# Patient Record
Sex: FEMALE | Race: BLACK OR AFRICAN AMERICAN | NOT HISPANIC OR LATINO | URBAN - METROPOLITAN AREA
[De-identification: names, ages, dates, MRNs, and addresses within clinical notes are randomized per-mention and may not be internally consistent; named-entity substitution may affect disease eponyms.]

---

## 2017-06-08 ENCOUNTER — INPATIENT (INPATIENT)
Facility: HOSPITAL | Age: 42
LOS: 0 days | Discharge: HOME | DRG: 812 | End: 2017-06-09
Attending: HOSPITALIST | Admitting: HOSPITALIST
Payer: COMMERCIAL

## 2017-06-08 VITALS
OXYGEN SATURATION: 98 % | RESPIRATION RATE: 18 BRPM | HEART RATE: 110 BPM | DIASTOLIC BLOOD PRESSURE: 65 MMHG | SYSTOLIC BLOOD PRESSURE: 145 MMHG | WEIGHT: 199.96 LBS | TEMPERATURE: 99 F

## 2017-06-08 DIAGNOSIS — Z29.9 ENCOUNTER FOR PROPHYLACTIC MEASURES, UNSPECIFIED: ICD-10-CM

## 2017-06-08 DIAGNOSIS — R63.8 OTHER SYMPTOMS AND SIGNS CONCERNING FOOD AND FLUID INTAKE: ICD-10-CM

## 2017-06-08 DIAGNOSIS — D50.0 IRON DEFICIENCY ANEMIA SECONDARY TO BLOOD LOSS (CHRONIC): ICD-10-CM

## 2017-06-08 DIAGNOSIS — D25.9 LEIOMYOMA OF UTERUS, UNSPECIFIED: ICD-10-CM

## 2017-06-08 LAB
ALBUMIN SERPL ELPH-MCNC: 3.9 G/DL — SIGNIFICANT CHANGE UP (ref 3.3–5)
ALP SERPL-CCNC: 60 U/L — SIGNIFICANT CHANGE UP (ref 40–120)
ALT FLD-CCNC: 9 U/L — LOW (ref 10–45)
ANION GAP SERPL CALC-SCNC: 15 MMOL/L — SIGNIFICANT CHANGE UP (ref 5–17)
ANISOCYTOSIS BLD QL: SIGNIFICANT CHANGE UP
APPEARANCE UR: CLEAR — SIGNIFICANT CHANGE UP
APTT BLD: 27.6 SEC — SIGNIFICANT CHANGE UP (ref 27.5–37.4)
AST SERPL-CCNC: 14 U/L — SIGNIFICANT CHANGE UP (ref 10–40)
BASOPHILS NFR BLD AUTO: 0.9 % — SIGNIFICANT CHANGE UP (ref 0–2)
BILIRUB SERPL-MCNC: 0.3 MG/DL — SIGNIFICANT CHANGE UP (ref 0.2–1.2)
BILIRUB UR-MCNC: NEGATIVE — SIGNIFICANT CHANGE UP
BUN SERPL-MCNC: 6 MG/DL — LOW (ref 7–23)
CALCIUM SERPL-MCNC: 8.7 MG/DL — SIGNIFICANT CHANGE UP (ref 8.4–10.5)
CHLORIDE SERPL-SCNC: 102 MMOL/L — SIGNIFICANT CHANGE UP (ref 96–108)
CO2 SERPL-SCNC: 21 MMOL/L — LOW (ref 22–31)
COLOR SPEC: YELLOW — SIGNIFICANT CHANGE UP
CREAT SERPL-MCNC: 0.6 MG/DL — SIGNIFICANT CHANGE UP (ref 0.5–1.3)
DACRYOCYTES BLD QL SMEAR: SIGNIFICANT CHANGE UP
DIFF PNL FLD: NEGATIVE — SIGNIFICANT CHANGE UP
EOSINOPHIL NFR BLD AUTO: 0.4 % — SIGNIFICANT CHANGE UP (ref 0–6)
GLUCOSE SERPL-MCNC: 119 MG/DL — HIGH (ref 70–99)
GLUCOSE UR QL: NEGATIVE — SIGNIFICANT CHANGE UP
HCG SERPL-ACNC: <.1 MIU/ML — SIGNIFICANT CHANGE UP
HCT VFR BLD CALC: 15.2 % — CRITICAL LOW (ref 34.5–45)
HCT VFR BLD CALC: 18.1 % — CRITICAL LOW (ref 34.5–45)
HGB BLD-MCNC: 3.7 G/DL — CRITICAL LOW (ref 11.5–15.5)
HGB BLD-MCNC: 5 G/DL — CRITICAL LOW (ref 11.5–15.5)
HYPOCHROMIA BLD QL: SIGNIFICANT CHANGE UP
INR BLD: 1.25 — HIGH (ref 0.88–1.16)
IRON SATN MFR SERPL: 16 UG/DL — LOW (ref 30–160)
IRON SATN MFR SERPL: 4 % — LOW (ref 14–50)
KETONES UR-MCNC: NEGATIVE — SIGNIFICANT CHANGE UP
LEUKOCYTE ESTERASE UR-ACNC: NEGATIVE — SIGNIFICANT CHANGE UP
LG PLATELETS BLD QL AUTO: PRESENT — SIGNIFICANT CHANGE UP
LYMPHOCYTES # BLD AUTO: 27.5 % — SIGNIFICANT CHANGE UP (ref 13–44)
MANUAL DIF COMMENT BLD-IMP: SIGNIFICANT CHANGE UP
MCHC RBC-ENTMCNC: 16.6 PG — LOW (ref 27–34)
MCHC RBC-ENTMCNC: 19.8 PG — LOW (ref 27–34)
MCHC RBC-ENTMCNC: 24.3 G/DL — LOW (ref 32–36)
MCHC RBC-ENTMCNC: 27.6 G/DL — LOW (ref 32–36)
MCV RBC AUTO: 68.2 FL — LOW (ref 80–100)
MCV RBC AUTO: 71.8 FL — LOW (ref 80–100)
MICROCYTES BLD QL: SIGNIFICANT CHANGE UP
MONOCYTES NFR BLD AUTO: 7 % — SIGNIFICANT CHANGE UP (ref 2–14)
NEUTROPHILS NFR BLD AUTO: 64.2 % — SIGNIFICANT CHANGE UP (ref 43–77)
NITRITE UR-MCNC: NEGATIVE — SIGNIFICANT CHANGE UP
PH UR: 7 — SIGNIFICANT CHANGE UP (ref 5–8)
PLAT MORPH BLD: (no result)
PLATELET # BLD AUTO: 318 K/UL — SIGNIFICANT CHANGE UP (ref 150–400)
PLATELET # BLD AUTO: 433 K/UL — HIGH (ref 150–400)
POTASSIUM SERPL-MCNC: 3.7 MMOL/L — SIGNIFICANT CHANGE UP (ref 3.5–5.3)
POTASSIUM SERPL-SCNC: 3.7 MMOL/L — SIGNIFICANT CHANGE UP (ref 3.5–5.3)
PROT SERPL-MCNC: 7.7 G/DL — SIGNIFICANT CHANGE UP (ref 6–8.3)
PROT UR-MCNC: NEGATIVE MG/DL — SIGNIFICANT CHANGE UP
PROTHROM AB SERPL-ACNC: 13.9 SEC — HIGH (ref 9.8–12.7)
RBC # BLD: 2.23 M/UL — LOW (ref 3.8–5.2)
RBC # BLD: 2.23 M/UL — LOW (ref 3.8–5.2)
RBC # BLD: 2.52 M/UL — LOW (ref 3.8–5.2)
RBC # FLD: 29.9 % — HIGH (ref 10.3–16.9)
RBC # FLD: 32.6 % — HIGH (ref 10.3–16.9)
RBC BLD AUTO: (no result)
RETICS/RBC NFR: 1.1 % — SIGNIFICANT CHANGE UP (ref 0.5–2.5)
SCHISTOCYTES BLD QL AUTO: SIGNIFICANT CHANGE UP
SODIUM SERPL-SCNC: 138 MMOL/L — SIGNIFICANT CHANGE UP (ref 135–145)
SP GR SPEC: 1.01 — SIGNIFICANT CHANGE UP (ref 1–1.03)
TIBC SERPL-MCNC: 434 UG/DL — HIGH (ref 220–430)
UIBC SERPL-MCNC: 418 UG/DL — HIGH (ref 110–370)
UROBILINOGEN FLD QL: 1 E.U./DL — SIGNIFICANT CHANGE UP
WBC # BLD: 5 K/UL — SIGNIFICANT CHANGE UP (ref 3.8–10.5)
WBC # BLD: 5.4 K/UL — SIGNIFICANT CHANGE UP (ref 3.8–10.5)
WBC # FLD AUTO: 5 K/UL — SIGNIFICANT CHANGE UP (ref 3.8–10.5)
WBC # FLD AUTO: 5.4 K/UL — SIGNIFICANT CHANGE UP (ref 3.8–10.5)

## 2017-06-08 PROCEDURE — 99223 1ST HOSP IP/OBS HIGH 75: CPT | Mod: GC

## 2017-06-08 PROCEDURE — 93010 ELECTROCARDIOGRAM REPORT: CPT

## 2017-06-08 PROCEDURE — 99285 EMERGENCY DEPT VISIT HI MDM: CPT | Mod: 25

## 2017-06-08 RX ORDER — POTASSIUM CHLORIDE 20 MEQ
40 PACKET (EA) ORAL ONCE
Qty: 0 | Refills: 0 | Status: COMPLETED | OUTPATIENT
Start: 2017-06-08 | End: 2017-06-08

## 2017-06-08 RX ORDER — ACETAMINOPHEN 500 MG
650 TABLET ORAL EVERY 6 HOURS
Qty: 0 | Refills: 0 | Status: DISCONTINUED | OUTPATIENT
Start: 2017-06-08 | End: 2017-06-09

## 2017-06-08 RX ORDER — SODIUM CHLORIDE 9 MG/ML
1000 INJECTION INTRAMUSCULAR; INTRAVENOUS; SUBCUTANEOUS ONCE
Qty: 0 | Refills: 0 | Status: COMPLETED | OUTPATIENT
Start: 2017-06-08 | End: 2017-06-08

## 2017-06-08 RX ORDER — DIPHENHYDRAMINE HCL 50 MG
25 CAPSULE ORAL ONCE
Qty: 0 | Refills: 0 | Status: COMPLETED | OUTPATIENT
Start: 2017-06-08 | End: 2017-06-08

## 2017-06-08 RX ORDER — ACETAMINOPHEN 500 MG
650 TABLET ORAL ONCE
Qty: 0 | Refills: 0 | Status: COMPLETED | OUTPATIENT
Start: 2017-06-08 | End: 2017-06-08

## 2017-06-08 RX ADMIN — SODIUM CHLORIDE 1000 MILLILITER(S): 9 INJECTION INTRAMUSCULAR; INTRAVENOUS; SUBCUTANEOUS at 12:57

## 2017-06-08 RX ADMIN — Medication 650 MILLIGRAM(S): at 16:00

## 2017-06-08 RX ADMIN — Medication 25 MILLIGRAM(S): at 15:09

## 2017-06-08 RX ADMIN — Medication 40 MILLIEQUIVALENT(S): at 15:01

## 2017-06-08 RX ADMIN — Medication 650 MILLIGRAM(S): at 15:09

## 2017-06-08 NOTE — ED PROVIDER NOTE - MEDICAL DECISION MAKING DETAILS
41yoF here with symptomatic anemia, hgb 3.7, hemodynamically stable, no active bleeding, admit for PRBC transfusion.

## 2017-06-08 NOTE — ED ADULT NURSE REASSESSMENT NOTE - NS ED NURSE REASSESS COMMENT FT1
MD Yoder request pt to be moved to OB/GYN Room for pelvic exam. Pt educated on importance. Pt refuses. MD Eaton made aware. Will continue to monitor.

## 2017-06-08 NOTE — H&P ADULT - NSHPPHYSICALEXAM_GEN_ALL_CORE
OVERNIGHT EVENTS:    VITAL SIGNS: Vital Signs Last 24 Hrs  T(F): 99, Max: 99.2 (06-08 @ 12:42)  HR: 97 (97 - 110)  BP: 127/70 (127/70 - 145/65)  RR: 18 (16 - 18)  SpO2: 100% (98% - 100%)    Constitutional: well appearing, WDWN, NAD  HEENT: NCAT, PERRL, sclera non-icteric, conjunctival pallor, MMM, neck supple, no JVD  Respiratory: CTA b/l, no wheezes, no rales, no rhonchi  Cardiovascular: RRR, normal s1/s2, no MRG  Gastrointestinal: soft, NTND, +BS, no guarding, no organomegaly  Extremities: WWP, DP/radial pulses 2+ b/l, no edema  Neurological: AAOx 3, responds to commands, moves all extremities, CN 2-12 intact  Musculoskeletal: 5/5 strength throughout  Gyn: Pt refused  Rectal: Pt refused

## 2017-06-08 NOTE — H&P ADULT - NSHPLABSRESULTS_GEN_ALL_CORE
3.7    5.4   )-----------( 433      ( 08 Jun 2017 12:48 )             15.2     138  |  102  |  6<L>  ----------------------------<  119<H>  3.7   |  21<L>  |  0.60    Ca    8.7      08 Jun 2017 12:48    TPro  7.7  /  Alb  3.9  /  TBili  0.3  /  DBili  x   /  AST  14  /  ALT  9<L>  /  AlkPhos  60  06-08 -                         3.7    5.4   )-----------( 433      ( 08 Jun 2017 12:48 )             15.2     138  |  102  |  6<L>  ----------------------------<  119<H>  3.7   |  21<L>  |  0.60    Ca    8.7      08 Jun 2017 12:48    TPro  7.7  /  Alb  3.9  /  TBili  0.3  /  DBili  x   /  AST  14  /  ALT  9<L>  /  AlkPhos  60  06-08

## 2017-06-08 NOTE — ED PROVIDER NOTE - OBJECTIVE STATEMENT
41yoF here with low hgb found on outpt labs. Pt with hx of severe anemia d/t fibroids and heavy menses. About 5 days ago, pt felt lightheaded and had near-syncopal episode, went to her PMD, who checked labs and called her today with the result of a hgb of 3. Pt c/o general fatigue with SOB on exertion x one month, LMP was 5/20, lasted 8 days, days #2 & 3 were heavy but the rest of the days were her normal amount of bleeding. No chest pain, no f/c, no cough/uri sx, no abd pain, no urinary sx, not currently bleeding.

## 2017-06-08 NOTE — H&P ADULT - ASSESSMENT
42 y/o F w/ a PMHx of fibroids and menorrhagia presenting w/ symptomatic anemia, found w/ Hgb of 3.7, being admitted for transfusion.

## 2017-06-08 NOTE — ED ADULT NURSE NOTE - OBJECTIVE STATEMENT
41y F, Hx of anemia, A&ox3, came into Er for low hemoglobin. States had blood done Saturday and "my doctors said it was 3" History of multiple blood transfusions. States had heavy menses in may 20 to may 28. Pt Denies sob, cp, n/v, no abdominal pain. Denies active bleeding. Lungs clear bilateral. States "when I was in the shower I got dizzy." C/o intermittent dyspnea on exertion x1 mo. PT appears mildly pale. On arrival pt standing and chatting on phone appears in no distress. Labs drawn. Will continue to monitor. 41y F, Hx of anemia, A&ox3, came into Er for low hemoglobin. States had blood done Saturday and "my doctors said it was 3" History of multiple blood transfusions. States had heavy menses in may 20 to may 28. Pt Denies sob, cp, n/v, no abdominal pain. Denies active bleeding. Lungs clear bilateral. States "when I was in the shower I got dizzy." C/o intermittent dyspnea on exertion x1 mo. PT appears pale. On arrival pt standing and chatting on phone appears in no distress. Labs drawn. Will continue to monitor.

## 2017-06-08 NOTE — H&P ADULT - HISTORY OF PRESENT ILLNESS
Pt is a 42 y/o F w/ a PMHx of fibroids and menorrhagia, presenting after informed by pt's PCP of  low hgb outpt labs. Pt reported starting to having SUERO for the last 2 weeks, and had an episode of lightheadedness on Saturday, after which pt went to see her PCP, who michael labs on her and pt was found w/ a hgb of 3. Pt's LMP was ion 5/20, lasting 8 days w/ heavy bleeding on days 2 & 3. Pt had a similar episode 2 years ago and had to be transfused at the time. Pt denied any bloody bowel movement, no blood in urine. Denied any chest pain, or SOB at rest. Denied any fevers/chills, cough, sore throat.     In the ED, pt's VSS and pt was found w/ a hgb of 3.7, upon which pt was ordered 2U of PRBC transfusion.

## 2017-06-08 NOTE — H&P ADULT - ATTENDING COMMENTS
pt seen and examined by me. case d/w housestaff. agree with VS, PE, assessment and plan as outlined above.

## 2017-06-08 NOTE — H&P ADULT - PROBLEM SELECTOR PLAN 2
-  Pt w/ hx of fibroids associated w/ menorrhagia.  - Outpt f/u w/ Dr. Ramey (Gyn) -  Pt w/ hx of fibroids associated w/ menorrhagia.  - Outpt f/u w/ Dr. Ramey (Gyn)  - f/u Pelvic U/S

## 2017-06-08 NOTE — ED ADULT TRIAGE NOTE - CHIEF COMPLAINT QUOTE
patient BIBA for low Hgb. hx of chronic anemia. had blood drawn on sunday. patient comlpains of mild exertional SOB, no other complaints

## 2017-06-08 NOTE — ED ADULT NURSE NOTE - CHPI ED SYMPTOMS NEG
no chills/no nausea/no decreased eating/drinking/no tingling/no pain/no vomiting/no numbness/no weakness/no fever

## 2017-06-08 NOTE — H&P ADULT - PROBLEM SELECTOR PLAN 1
-  Pt found w/ a hgb of 3.7, presenting w/ symptomatic anemia, associated w/ lightheadedeness and SUERO. Microcytic  - Start 2U of PRBC transfusion - f/u post-transfusion CBC  - f/u Iron panel -  Pt found w/ a hgb of 3.7, presenting w/ symptomatic anemia, associated w/ lightheadedeness and SUERO. Microcytic  - Start 2U of PRBC transfusion - f/u post-transfusion CBC  - f/u Iron panel  - f/u Pelvic U/S, fecal occult

## 2017-06-09 VITALS
SYSTOLIC BLOOD PRESSURE: 113 MMHG | DIASTOLIC BLOOD PRESSURE: 77 MMHG | HEART RATE: 74 BPM | RESPIRATION RATE: 19 BRPM | TEMPERATURE: 97 F | OXYGEN SATURATION: 98 %

## 2017-06-09 LAB
ANION GAP SERPL CALC-SCNC: 15 MMOL/L — SIGNIFICANT CHANGE UP (ref 5–17)
BUN SERPL-MCNC: 6 MG/DL — LOW (ref 7–23)
CALCIUM SERPL-MCNC: 9.3 MG/DL — SIGNIFICANT CHANGE UP (ref 8.4–10.5)
CHLORIDE SERPL-SCNC: 105 MMOL/L — SIGNIFICANT CHANGE UP (ref 96–108)
CO2 SERPL-SCNC: 20 MMOL/L — LOW (ref 22–31)
CREAT SERPL-MCNC: 0.6 MG/DL — SIGNIFICANT CHANGE UP (ref 0.5–1.3)
GLUCOSE SERPL-MCNC: 79 MG/DL — SIGNIFICANT CHANGE UP (ref 70–99)
HCT VFR BLD CALC: 22.3 % — LOW (ref 34.5–45)
HCT VFR BLD CALC: 22.7 % — LOW (ref 34.5–45)
HGB BLD-MCNC: 6.7 G/DL — CRITICAL LOW (ref 11.5–15.5)
HGB BLD-MCNC: 6.8 G/DL — CRITICAL LOW (ref 11.5–15.5)
MAGNESIUM SERPL-MCNC: 2.1 MG/DL — SIGNIFICANT CHANGE UP (ref 1.6–2.6)
MCHC RBC-ENTMCNC: 21.4 PG — LOW (ref 27–34)
MCHC RBC-ENTMCNC: 21.5 PG — LOW (ref 27–34)
MCHC RBC-ENTMCNC: 30 G/DL — LOW (ref 32–36)
MCHC RBC-ENTMCNC: 30 G/DL — LOW (ref 32–36)
MCV RBC AUTO: 71.4 FL — LOW (ref 80–100)
MCV RBC AUTO: 71.5 FL — LOW (ref 80–100)
PLATELET # BLD AUTO: 265 K/UL — SIGNIFICANT CHANGE UP (ref 150–400)
PLATELET # BLD AUTO: 289 K/UL — SIGNIFICANT CHANGE UP (ref 150–400)
POTASSIUM SERPL-MCNC: 4 MMOL/L — SIGNIFICANT CHANGE UP (ref 3.5–5.3)
POTASSIUM SERPL-SCNC: 4 MMOL/L — SIGNIFICANT CHANGE UP (ref 3.5–5.3)
RBC # BLD: 3.12 M/UL — LOW (ref 3.8–5.2)
RBC # BLD: 3.18 M/UL — LOW (ref 3.8–5.2)
RBC # FLD: 27.2 % — HIGH (ref 10.3–16.9)
RBC # FLD: 27.4 % — HIGH (ref 10.3–16.9)
SODIUM SERPL-SCNC: 140 MMOL/L — SIGNIFICANT CHANGE UP (ref 135–145)
WBC # BLD: 4.2 K/UL — SIGNIFICANT CHANGE UP (ref 3.8–10.5)
WBC # BLD: 4.6 K/UL — SIGNIFICANT CHANGE UP (ref 3.8–10.5)
WBC # FLD AUTO: 4.2 K/UL — SIGNIFICANT CHANGE UP (ref 3.8–10.5)
WBC # FLD AUTO: 4.6 K/UL — SIGNIFICANT CHANGE UP (ref 3.8–10.5)

## 2017-06-09 PROCEDURE — 36430 TRANSFUSION BLD/BLD COMPNT: CPT

## 2017-06-09 PROCEDURE — 93005 ELECTROCARDIOGRAM TRACING: CPT

## 2017-06-09 PROCEDURE — 86850 RBC ANTIBODY SCREEN: CPT

## 2017-06-09 PROCEDURE — 85027 COMPLETE CBC AUTOMATED: CPT

## 2017-06-09 PROCEDURE — 84702 CHORIONIC GONADOTROPIN TEST: CPT

## 2017-06-09 PROCEDURE — 85025 COMPLETE CBC W/AUTO DIFF WBC: CPT

## 2017-06-09 PROCEDURE — 85045 AUTOMATED RETICULOCYTE COUNT: CPT

## 2017-06-09 PROCEDURE — 86901 BLOOD TYPING SEROLOGIC RH(D): CPT

## 2017-06-09 PROCEDURE — 81003 URINALYSIS AUTO W/O SCOPE: CPT

## 2017-06-09 PROCEDURE — 85610 PROTHROMBIN TIME: CPT

## 2017-06-09 PROCEDURE — 80048 BASIC METABOLIC PNL TOTAL CA: CPT

## 2017-06-09 PROCEDURE — 83735 ASSAY OF MAGNESIUM: CPT

## 2017-06-09 PROCEDURE — 36415 COLL VENOUS BLD VENIPUNCTURE: CPT

## 2017-06-09 PROCEDURE — 80053 COMPREHEN METABOLIC PANEL: CPT

## 2017-06-09 PROCEDURE — 82728 ASSAY OF FERRITIN: CPT

## 2017-06-09 PROCEDURE — P9016: CPT

## 2017-06-09 PROCEDURE — 99239 HOSP IP/OBS DSCHRG MGMT >30: CPT

## 2017-06-09 PROCEDURE — 85730 THROMBOPLASTIN TIME PARTIAL: CPT

## 2017-06-09 PROCEDURE — 86900 BLOOD TYPING SEROLOGIC ABO: CPT

## 2017-06-09 PROCEDURE — 86923 COMPATIBILITY TEST ELECTRIC: CPT

## 2017-06-09 PROCEDURE — 83550 IRON BINDING TEST: CPT

## 2017-06-09 PROCEDURE — 99285 EMERGENCY DEPT VISIT HI MDM: CPT | Mod: 25

## 2017-06-09 RX ORDER — SODIUM FERRIC GLUCONAT/SUCROSE 62.5MG/5ML
25 AMPUL (ML) INTRAVENOUS ONCE
Qty: 0 | Refills: 0 | Status: COMPLETED | OUTPATIENT
Start: 2017-06-09 | End: 2017-06-09

## 2017-06-09 RX ORDER — SODIUM FERRIC GLUCONAT/SUCROSE 62.5MG/5ML
125 AMPUL (ML) INTRAVENOUS DAILY
Qty: 0 | Refills: 0 | Status: DISCONTINUED | OUTPATIENT
Start: 2017-06-10 | End: 2017-06-09

## 2017-06-09 RX ORDER — DOCUSATE SODIUM 100 MG
1 CAPSULE ORAL
Qty: 30 | Refills: 0 | OUTPATIENT
Start: 2017-06-09 | End: 2017-07-09

## 2017-06-09 RX ORDER — ASCORBIC ACID 60 MG
1 TABLET,CHEWABLE ORAL
Qty: 30 | Refills: 0 | OUTPATIENT
Start: 2017-06-09 | End: 2017-07-09

## 2017-06-09 RX ORDER — POLYETHYLENE GLYCOL 3350 17 G/17G
17 POWDER, FOR SOLUTION ORAL
Qty: 1020 | Refills: 0 | OUTPATIENT
Start: 2017-06-09 | End: 2017-07-09

## 2017-06-09 RX ORDER — FERROUS SULFATE 325(65) MG
1 TABLET ORAL
Qty: 30 | Refills: 0 | OUTPATIENT
Start: 2017-06-09 | End: 2017-07-09

## 2017-06-09 RX ORDER — SODIUM FERRIC GLUCONAT/SUCROSE 62.5MG/5ML
100 AMPUL (ML) INTRAVENOUS ONCE
Qty: 0 | Refills: 0 | Status: COMPLETED | OUTPATIENT
Start: 2017-06-09 | End: 2017-06-09

## 2017-06-09 RX ADMIN — Medication 116 MILLIGRAM(S): at 10:51

## 2017-06-09 RX ADMIN — Medication 156 MILLIGRAM(S): at 09:28

## 2017-06-09 NOTE — DISCHARGE NOTE ADULT - PROVIDER TOKENS
FREE:[LAST:[Ana],FIRST:[[facility]],PHONE:[(   )    -],FAX:[(   )    -]],FREE:[LAST:[Medicine],FIRST:[Ana [facility]],PHONE:[phone]],FAX:[(   )    -],ADDRESS:[[address] [place] [address]]]

## 2017-06-09 NOTE — DISCHARGE NOTE ADULT - PLAN OF CARE
Follow up with a primary care physician for IV Iron infusions and oral iron You have anemia due to chronic blood loss from your uterine fibroids. You improved with 3 units of blood and iron infusion. Please see a primary care doctor at [facility] and obtain a referral to a hematologist. Please start taking oral iron pills, 325 mg ferrous sulfate daily. It can make you constipated so please take colace, a stool softener (over the counter also) with it daily. You can take it with vitamin c supplementaion or orange juice as it can help with absorption. Follow up with your OB/GYN please follow up with your ob/gyn for your fibroids as it is the cause of your anemia.

## 2017-06-09 NOTE — DISCHARGE NOTE ADULT - PATIENT PORTAL LINK FT
“You can access the [facility] Patient Portal, offered by [facility], by registering with the following website: [email or web address]”

## 2017-06-09 NOTE — DISCHARGE NOTE ADULT - CARE PROVIDER_API CALL
Ana, [facility]  Phone: (   )    -  Fax: (   )    -    Medicine, [place]  [address] [place], [address]  Phone: [phone]  Fax: (   )    -

## 2017-06-09 NOTE — DISCHARGE NOTE ADULT - CARE PLAN
Principal Discharge DX:	Anemia  Goal:	Follow up with a primary care physician for IV Iron infusions and oral iron  Instructions for follow-up, activity and diet:	You have anemia due to chronic blood loss from your uterine fibroids. You improved with 3 units of blood and iron infusion. Please see a primary care doctor at [facility] and obtain a referral to a hematologist. Please start taking oral iron pills, 325 mg ferrous sulfate daily. It can make you constipated so please take colace, a stool softener (over the counter also) with it daily. You can take it with vitamin c supplementaion or orange juice as it can help with absorption.  Secondary Diagnosis:	Fibroids  Goal:	Follow up with your OB/GYN  Instructions for follow-up, activity and diet:	please follow up with your ob/gyn for your fibroids as it is the cause of your anemia. Principal Discharge DX:	Anemia  Goal:	Follow up with a primary care physician for IV Iron infusions and oral iron  Instructions for follow-up, activity and diet:	You have anemia due to chronic blood loss from your uterine fibroids. You improved with 3 units of blood and iron infusion. Please see a primary care doctor at [facility] and obtain a referral to a hematologist. Please start taking oral iron pills, 325 mg ferrous sulfate daily. It can make you constipated so please take colace, a stool softener (over the counter also) with it daily. You can take it with vitamin c supplementaion or orange juice as it can help with absorption.  Secondary Diagnosis:	Fibroids  Goal:	Follow up with your OB/GYN  Instructions for follow-up, activity and diet:	please follow up with your ob/gyn for your fibroids as it is the cause of your anemia. Principal Discharge DX:	Anemia  Goal:	Follow up with a primary care physician for IV Iron infusions and oral iron  Instructions for follow-up, activity and diet:	You have anemia due to chronic blood loss from your uterine fibroids. You improved with 3 units of blood and iron infusion. Please see a primary care doctor at [facility] and obtain a referral to a hematologist. Please start taking oral iron pills, 325 mg ferrous sulfate daily. It can make you constipated so please take colace, a stool softener (over the counter also) with it daily. You can take it with vitamin c supplementaion or orange juice as it can help with absorption.  Secondary Diagnosis:	Fibroids  Goal:	Follow up with your OB/GYN  Instructions for follow-up, activity and diet:	please follow up with your ob/gyn for your fibroids as it is the cause of your anemia. Principal Discharge DX:	Anemia  Goal:	Follow up with a primary care physician for IV Iron infusions and oral iron  Instructions for follow-up, activity and diet:	You have anemia due to chronic blood loss from your uterine fibroids. You improved with 3 units of blood and iron infusion. Please see a primary care doctor at [facility] and obtain a referral to a hematologist. Please start taking oral iron pills, 325 mg ferrous sulfate daily. It can make you constipated so please take colace, a stool softener (over the counter also) with it daily. You can take it with vitamin c supplementaion or orange juice as it can help with absorption.  Secondary Diagnosis:	Fibroids  Goal:	Follow up with your OB/GYN  Instructions for follow-up, activity and diet:	please follow up with your ob/gyn for your fibroids as it is the cause of your anemia.

## 2017-06-09 NOTE — DISCHARGE NOTE ADULT - MEDICATION SUMMARY - MEDICATIONS TO TAKE
I will START or STAY ON the medications listed below when I get home from the hospital:    Antivert 25 mg oral tablet  -- 1 tab(s) by mouth 2 times a day  -- May cause drowsiness.  Alcohol may intensify this effect.  Use care when operating dangerous machinery.    -- Indication: For Vertigo I will START or STAY ON the medications listed below when I get home from the hospital:    Antivert 25 mg oral tablet  -- 1 tab(s) by mouth 2 times a day  -- May cause drowsiness.  Alcohol may intensify this effect.  Use care when operating dangerous machinery.    -- Indication: For Vertigo    ferrous sulfate 325 mg (65 mg elemental iron) oral tablet  -- 1 tab(s) by mouth once a day  -- Check with your doctor before becoming pregnant.  Do not chew, break, or crush.  May discolor urine or feces.    -- Indication: For Iron Deficiency Anemia    MiraLax oral powder for reconstitution  -- 17 gram(s) by mouth 2 times a day, As Needed -for constipation  -- Dilute this medication with liquid before administration.  It is very important that you take or use this exactly as directed.  Do not skip doses or discontinue unless directed by your doctor.    -- Indication: For Prophylactic measure    docusate calcium 240 mg oral capsule  -- 1 cap(s) by mouth once a day  -- Medication should be taken with plenty of water.    -- Indication: For Prophylactic measure    ascorbic acid 100 mg oral tablet, chewable  -- 1 tab(s) by mouth once a day  -- Chew tablets before swallowing    -- Indication: For Prophylactic measure

## 2017-06-09 NOTE — DISCHARGE NOTE ADULT - HOSPITAL COURSE
42 y/o F w/ a PMHx of fibroids and menorrhagia, presenting after informed by pt's PCP of  low hgb outpt labs. No active bleeding. Upon arrival pt was tachycardic to 114 with  systolic. Hgb 3.7 upon admission with iron panel showing microcytic anemia consistent with iron deficiency anemia. Patient received 3 units of pRBC total with repeat hgb of 6.8. Given IV iron infusion. Patient's symptoms improved, ready for dc with close follow up with primary care at [facility]. 40 y/o F w/ a PMHx of fibroids and menorrhagia, presenting after informed by pt's PCP of  low hgb outpt labs. No active bleeding. Upon arrival pt was tachycardic to 114 with  systolic. Hgb 3.7 upon admission with iron panel showing microcytic anemia consistent with iron deficiency anemia. Patient received 3 units of pRBC total with repeat hgb of 6.8. Given IV iron infusion. Pelvic US obtained, showed ____________. Patient's symptoms improved, ready for dc with close follow up with primary care at [facility].

## 2017-06-13 DIAGNOSIS — D50.0 IRON DEFICIENCY ANEMIA SECONDARY TO BLOOD LOSS (CHRONIC): ICD-10-CM

## 2017-06-13 DIAGNOSIS — N92.0 EXCESSIVE AND FREQUENT MENSTRUATION WITH REGULAR CYCLE: ICD-10-CM

## 2017-06-13 DIAGNOSIS — Z88.6 ALLERGY STATUS TO ANALGESIC AGENT: ICD-10-CM

## 2017-06-13 DIAGNOSIS — D25.9 LEIOMYOMA OF UTERUS, UNSPECIFIED: ICD-10-CM

## 2017-06-13 DIAGNOSIS — R00.0 TACHYCARDIA, UNSPECIFIED: ICD-10-CM

## 2017-06-13 DIAGNOSIS — D64.9 ANEMIA, UNSPECIFIED: ICD-10-CM

## 2017-07-17 PROBLEM — Z00.00 ENCOUNTER FOR PREVENTIVE HEALTH EXAMINATION: Status: ACTIVE | Noted: 2017-07-17

## 2017-08-15 ENCOUNTER — APPOINTMENT (OUTPATIENT)
Dept: INTERNAL MEDICINE | Facility: CLINIC | Age: 42
End: 2017-08-15

## 2019-04-17 ENCOUNTER — TRANSCRIPTION ENCOUNTER (OUTPATIENT)
Age: 44
End: 2019-04-17

## 2019-04-17 ENCOUNTER — INPATIENT (INPATIENT)
Facility: HOSPITAL | Age: 44
LOS: 0 days | Discharge: HOME | DRG: 812 | End: 2019-04-18
Attending: STUDENT IN AN ORGANIZED HEALTH CARE EDUCATION/TRAINING PROGRAM | Admitting: STUDENT IN AN ORGANIZED HEALTH CARE EDUCATION/TRAINING PROGRAM
Payer: COMMERCIAL

## 2019-04-17 VITALS
OXYGEN SATURATION: 96 % | RESPIRATION RATE: 20 BRPM | SYSTOLIC BLOOD PRESSURE: 163 MMHG | HEART RATE: 102 BPM | WEIGHT: 210.1 LBS | DIASTOLIC BLOOD PRESSURE: 68 MMHG | TEMPERATURE: 98 F

## 2019-04-17 DIAGNOSIS — Z91.89 OTHER SPECIFIED PERSONAL RISK FACTORS, NOT ELSEWHERE CLASSIFIED: ICD-10-CM

## 2019-04-17 DIAGNOSIS — Z29.9 ENCOUNTER FOR PROPHYLACTIC MEASURES, UNSPECIFIED: ICD-10-CM

## 2019-04-17 DIAGNOSIS — D50.0 IRON DEFICIENCY ANEMIA SECONDARY TO BLOOD LOSS (CHRONIC): ICD-10-CM

## 2019-04-17 DIAGNOSIS — D21.9 BENIGN NEOPLASM OF CONNECTIVE AND OTHER SOFT TISSUE, UNSPECIFIED: ICD-10-CM

## 2019-04-17 LAB
ALBUMIN SERPL ELPH-MCNC: 4.2 G/DL — SIGNIFICANT CHANGE UP (ref 3.3–5)
ALP SERPL-CCNC: 61 U/L — SIGNIFICANT CHANGE UP (ref 40–120)
ALT FLD-CCNC: 16 U/L — SIGNIFICANT CHANGE UP (ref 10–45)
ANION GAP SERPL CALC-SCNC: 13 MMOL/L — SIGNIFICANT CHANGE UP (ref 5–17)
AST SERPL-CCNC: 28 U/L — SIGNIFICANT CHANGE UP (ref 10–40)
BASOPHILS # BLD AUTO: 0.07 K/UL — SIGNIFICANT CHANGE UP (ref 0–0.2)
BASOPHILS NFR BLD AUTO: 0.8 % — SIGNIFICANT CHANGE UP (ref 0–2)
BILIRUB SERPL-MCNC: 0.3 MG/DL — SIGNIFICANT CHANGE UP (ref 0.2–1.2)
BLD GP AB SCN SERPL QL: NEGATIVE — SIGNIFICANT CHANGE UP
BUN SERPL-MCNC: 6 MG/DL — LOW (ref 7–23)
CALCIUM SERPL-MCNC: 9.6 MG/DL — SIGNIFICANT CHANGE UP (ref 8.4–10.5)
CHLORIDE SERPL-SCNC: 105 MMOL/L — SIGNIFICANT CHANGE UP (ref 96–108)
CO2 SERPL-SCNC: 23 MMOL/L — SIGNIFICANT CHANGE UP (ref 22–31)
CREAT SERPL-MCNC: 0.66 MG/DL — SIGNIFICANT CHANGE UP (ref 0.5–1.3)
EOSINOPHIL # BLD AUTO: 0.08 K/UL — SIGNIFICANT CHANGE UP (ref 0–0.5)
EOSINOPHIL NFR BLD AUTO: 0.9 % — SIGNIFICANT CHANGE UP (ref 0–6)
GLUCOSE SERPL-MCNC: 111 MG/DL — HIGH (ref 70–99)
HCT VFR BLD CALC: 25.9 % — LOW (ref 34.5–45)
HGB BLD-MCNC: 6.4 G/DL — CRITICAL LOW (ref 11.5–15.5)
IMM GRANULOCYTES NFR BLD AUTO: 0.2 % — SIGNIFICANT CHANGE UP (ref 0–1.5)
LYMPHOCYTES # BLD AUTO: 2.11 K/UL — SIGNIFICANT CHANGE UP (ref 1–3.3)
LYMPHOCYTES # BLD AUTO: 24.1 % — SIGNIFICANT CHANGE UP (ref 13–44)
MCHC RBC-ENTMCNC: 17.7 PG — LOW (ref 27–34)
MCHC RBC-ENTMCNC: 24.7 GM/DL — LOW (ref 32–36)
MCV RBC AUTO: 71.7 FL — LOW (ref 80–100)
MONOCYTES # BLD AUTO: 0.48 K/UL — SIGNIFICANT CHANGE UP (ref 0–0.9)
MONOCYTES NFR BLD AUTO: 5.5 % — SIGNIFICANT CHANGE UP (ref 2–14)
NEUTROPHILS # BLD AUTO: 5.99 K/UL — SIGNIFICANT CHANGE UP (ref 1.8–7.4)
NEUTROPHILS NFR BLD AUTO: 68.5 % — SIGNIFICANT CHANGE UP (ref 43–77)
NRBC # BLD: 0 /100 WBCS — SIGNIFICANT CHANGE UP (ref 0–0)
PLATELET # BLD AUTO: 440 K/UL — HIGH (ref 150–400)
POTASSIUM SERPL-MCNC: 3.8 MMOL/L — SIGNIFICANT CHANGE UP (ref 3.5–5.3)
POTASSIUM SERPL-SCNC: 3.8 MMOL/L — SIGNIFICANT CHANGE UP (ref 3.5–5.3)
PROT SERPL-MCNC: 8.2 G/DL — SIGNIFICANT CHANGE UP (ref 6–8.3)
RBC # BLD: 3.61 M/UL — LOW (ref 3.8–5.2)
RBC # FLD: 26.5 % — HIGH (ref 10.3–14.5)
RH IG SCN BLD-IMP: POSITIVE — SIGNIFICANT CHANGE UP
SODIUM SERPL-SCNC: 141 MMOL/L — SIGNIFICANT CHANGE UP (ref 135–145)
WBC # BLD: 8.75 K/UL — SIGNIFICANT CHANGE UP (ref 3.8–10.5)
WBC # FLD AUTO: 8.75 K/UL — SIGNIFICANT CHANGE UP (ref 3.8–10.5)

## 2019-04-17 PROCEDURE — 99291 CRITICAL CARE FIRST HOUR: CPT

## 2019-04-17 PROCEDURE — 93010 ELECTROCARDIOGRAM REPORT: CPT

## 2019-04-17 PROCEDURE — 99223 1ST HOSP IP/OBS HIGH 75: CPT | Mod: GC

## 2019-04-17 RX ORDER — FERROUS SULFATE 325(65) MG
300 TABLET ORAL DAILY
Qty: 0 | Refills: 0 | Status: DISCONTINUED | OUTPATIENT
Start: 2019-04-17 | End: 2019-04-18

## 2019-04-17 NOTE — H&P ADULT - NSHPLABSRESULTS_GEN_ALL_CORE
LABS    04-17    141  |  105  |  6<L>  ----------------------------<  111<H>  3.8   |  23  |  0.66    Ca    9.6      17 Apr 2019 21:29    TPro  8.2  /  Alb  4.2  /  TBili  0.3  /  DBili  x   /  AST  28  /  ALT  16  /  AlkPhos  61  04-17    PT/INR - ( 17 Apr 2019 21:30 )   PT: 13.6 sec;   INR: 1.20          PTT - ( 17 Apr 2019 21:30 )  PTT:24.3 sec    EKG: NSR w/out ischemic changes

## 2019-04-17 NOTE — H&P ADULT - NSHPPHYSICALEXAM_GEN_ALL_CORE
VITALS  Vital Signs Last 24 Hrs  T(C): 36.8 (17 Apr 2019 21:03), Max: 36.8 (17 Apr 2019 21:03)  T(F): 98.2 (17 Apr 2019 21:03), Max: 98.2 (17 Apr 2019 21:03)  HR: 102 (17 Apr 2019 21:03) (102 - 102)  BP: 163/68 (17 Apr 2019 21:03) (163/68 - 163/68)  BP(mean): --  RR: 20 (17 Apr 2019 21:03) (20 - 20)  SpO2: 96% (17 Apr 2019 21:03) (96% - 96%)    PHYSICAL EXAM  General: NAD; comfortable  HEENT: NC/AT, supple neck, MMM, no JVD  Respiratory: CTA B/L; no wheezes/crackles w/ good air movement  Cardiovascular: Regular rhythm/rate; S1/S2  Gastrointestinal: Soft; NTND; bowel sounds normal  Extremities: WWP; no edema; no clubbing; radial/pedal pulses palpable  Neurological:  A&Ox3; PERRL; EOMI; CNII-XII grossly intact; 5/5 strength; sensation intact; reflexes 2+; no obvious focal deficits  Skin: No rashes or ulcers, normal tugor

## 2019-04-17 NOTE — ED PROVIDER NOTE - OBJECTIVE STATEMENT
41 year old female with history of uterine fibroids requiring blood transfusion x 2 in the past presents to ED with concern for dizziness and feeling like she is becoming anemic.  She notes completing her menstrual cycle last week and states symptoms began to develop shortly thereafter.  She denies vaginal bleeding at this time, bright red blood per rectum, melanotic appearing stool, chest pain, shortness of breath, abdominal pain, nausea, emesis fever, chills, or any additional acute complaints or concerns at this time.

## 2019-04-17 NOTE — ED ADULT NURSE NOTE - OBJECTIVE STATEMENT
Patient presents to ED c/o "wooziness" all day today. She states she went to Urgent Care and her HGB was 6.4. She has a history of 2 blood transfusion "a couple years ago" due to fibroids and heavy menstruation. She currently denies vaginal bleeding or abd pain. Last menstruation 1 week ago.

## 2019-04-17 NOTE — ED PROVIDER NOTE - CLINICAL SUMMARY MEDICAL DECISION MAKING FREE TEXT BOX
Patient in ED 2/2 concern for anemia.  Hx of anemia due to uterine fibroids noted.  Non toxic appearing.  Hgb 6.4 in ED today.  Patient is aware of all lab results and recommendation for transfusion given her symptomatic anemia.  1 unit pRBC ordered and patient consented for administration of blood products.  Will admit at this time.

## 2019-04-17 NOTE — H&P ADULT - PROBLEM SELECTOR PLAN 3
PPx: Low risk, hold VTE, no PPI  FEN: No fluids indicated; replete electrolytes PRN; regular diet  Needs: None  Gold: No  Access: Peripheral  Ulcers: None  Sepsis: Does not meet criteria  Goals of Care: Full code  Dispo: Admit to F for txn in setting of anemia

## 2019-04-17 NOTE — H&P ADULT - ATTENDING COMMENTS
patient seen and examined; LMP last week; reports improvement in sxs s/p 1 U PRBC     reviewed data including:  labs, ekg     agree w/ PE findings as above w/ additions/ exceptions/ pertinent findings: awake, alert, NAD; +conjunctival pallor b/l;  MMM; s1s2, lungs cta b/l, abdomen soft/nt/nd; no lower ext edema/tenderness b/l     1. anemia in setting of heavy menses: s/p 1 U PRBC overnight, followup post transfusion CBC. followup w/ outpatient GYN for fibroid treatment options. Pt reports being on IV iron infusions last year, amenable to restarting IV infusions, will need to followup w/ outpt  heme    rest of plan as above

## 2019-04-17 NOTE — H&P ADULT - ASSESSMENT
43F w/ uterine fibroids and menorrhagia presents w/ symptomatic anemia 2/2 iron deficiency and blood loss.

## 2019-04-17 NOTE — H&P ADULT - HISTORY OF PRESENT ILLNESS
43F w/ uterine fibroids and menorrhagia presents w/ symptomatic anemia.  She had her menses last week which has been prolonged and heavy (but w/out pain) in the setting of uterine fibroids.  She has been resistant to surgery out of fear.  She has required transfusions in the past, most recently in June, 2017 for similar reasons.  Today, she went to work but felt lightheaded and 'woozy' -- She went home from work and then went to [facility] where they noted a hgb of 6.4, they then recommended going to the ED for a transfusion.  She is adherent to her iron liquid supplements.  She denies any other acute symptoms or complaints.  She had no chest pain or SOB during this episode.  She denies blood in her urine or bowel movements.

## 2019-04-17 NOTE — H&P ADULT - PROBLEM SELECTOR PLAN 6
-PCP Contacted on Admission: (Y/N) --> Name & Phone #: Dr Diallo in [place] but patient in process of getting new PCP, most recently saw [facility] MD  -Date of Contact with PCP:  -PCP Contacted at Discharge: (Y/N, N/A)  -Summary of Handoff Given to PCP:   -Post-Discharge Appointment Date and Location:

## 2019-04-17 NOTE — H&P ADULT - NSICDXFAMILYHX_GEN_ALL_CORE_FT
No pertinent family history in first degree relatives FAMILY HISTORY:  FH: lupus, mother  FH: myocardial infarction, father

## 2019-04-17 NOTE — H&P ADULT - NSICDXPASTSURGICALHX_GEN_ALL_CORE_FT
PAST SURGICAL HISTORY:  Cytomegalovirus infection not present No significant past surgical history No significant past surgical history

## 2019-04-17 NOTE — ED ADULT NURSE NOTE - CHPI ED NUR SYMPTOMS NEG
no vomiting/no fever/no loss of consciousness/no nausea/no confusion/no change in level of consciousness/no numbness/no blurred vision

## 2019-04-17 NOTE — H&P ADULT - PROBLEM SELECTOR PLAN 2
Patient resistant to getting surgery.  No pain w/ menses but w/ heavy bleeding and clots, w/ prolonged menses.  - No intervention necessary at this time in setting of anemia

## 2019-04-17 NOTE — H&P ADULT - PROBLEM SELECTOR PLAN 1
Hgb 6.4 w/ low MCV.  Prolonged, heavy menses as recently as last week.  No evidence of other bleeding on exam.  Low concern for GI blood loss, low concern for hemolysis.  Has received multiple txns in past for similar reason, most recently 2017.  - Txn 1 u pRBC and follow up repeat CBC in am for possible 2nd unit  - C/w Fe supplementation

## 2019-04-17 NOTE — H&P ADULT - PROBLEM SELECTOR PLAN 5
PPx: Low risk, hold VTE, no PPI  FEN: No fluids indicated; replete electrolytes PRN; regular diet  Needs: None  Gold: No  Access: Peripheral  Ulcers: None  Sepsis: Does not meet criteria  Goals of Care: Full code  Dispo: Admit to F for txn

## 2019-04-17 NOTE — H&P ADULT - PROBLEM SELECTOR PLAN 4
-PCP Contacted on Admission: (Y/N) --> Name & Phone #: Dr Diallo in [place] but patient in process of getting new PCP, most recently saw [facility] MD  -Date of Contact with PCP:  -PCP Contacted at Discharge: (Y/N, N/A)  -Summary of Handoff Given to PCP:   -Post-Discharge Appointment Date and Location: Patient resistant to getting surgery.  No pain w/ menses but w/ heavy bleeding and clots, w/ prolonged menses.  - No intervention necessary at this time

## 2019-04-18 ENCOUNTER — TRANSCRIPTION ENCOUNTER (OUTPATIENT)
Age: 44
End: 2019-04-18

## 2019-04-18 VITALS
HEART RATE: 70 BPM | DIASTOLIC BLOOD PRESSURE: 75 MMHG | SYSTOLIC BLOOD PRESSURE: 117 MMHG | RESPIRATION RATE: 16 BRPM | OXYGEN SATURATION: 98 % | TEMPERATURE: 98 F

## 2019-04-18 DIAGNOSIS — R53.1 WEAKNESS: ICD-10-CM

## 2019-04-18 DIAGNOSIS — R42 DIZZINESS AND GIDDINESS: ICD-10-CM

## 2019-04-18 LAB
EXTRA GREEN TOP TUBE: SIGNIFICANT CHANGE UP
GLUCOSE BLDC GLUCOMTR-MCNC: 103 MG/DL — HIGH (ref 70–99)
HAPTOGLOB SERPL-MCNC: 86 MG/DL — SIGNIFICANT CHANGE UP (ref 34–200)
HCG SERPL-ACNC: <0 MIU/ML — SIGNIFICANT CHANGE UP
HCT VFR BLD CALC: 26.8 % — LOW (ref 34.5–45)
HCT VFR BLD CALC: 29.9 % — LOW (ref 34.5–45)
HGB BLD-MCNC: 6.8 G/DL — CRITICAL LOW (ref 11.5–15.5)
HGB BLD-MCNC: 7.8 G/DL — LOW (ref 11.5–15.5)
LDH SERPL L TO P-CCNC: 183 U/L — SIGNIFICANT CHANGE UP (ref 50–242)
MCHC RBC-ENTMCNC: 18.6 PG — LOW (ref 27–34)
MCHC RBC-ENTMCNC: 19.4 PG — LOW (ref 27–34)
MCHC RBC-ENTMCNC: 25.4 GM/DL — LOW (ref 32–36)
MCHC RBC-ENTMCNC: 26.1 GM/DL — LOW (ref 32–36)
MCV RBC AUTO: 73.2 FL — LOW (ref 80–100)
MCV RBC AUTO: 74.4 FL — LOW (ref 80–100)
NRBC # BLD: 0 /100 WBCS — SIGNIFICANT CHANGE UP (ref 0–0)
NRBC # BLD: 0 /100 WBCS — SIGNIFICANT CHANGE UP (ref 0–0)
PLATELET # BLD AUTO: 396 K/UL — SIGNIFICANT CHANGE UP (ref 150–400)
PLATELET # BLD AUTO: 430 K/UL — HIGH (ref 150–400)
RBC # BLD: 3.66 M/UL — LOW (ref 3.8–5.2)
RBC # BLD: 4.02 M/UL — SIGNIFICANT CHANGE UP (ref 3.8–5.2)
RBC # FLD: 25.2 % — HIGH (ref 10.3–14.5)
RBC # FLD: 25.2 % — HIGH (ref 10.3–14.5)
RETICS #: 27.7 K/UL — SIGNIFICANT CHANGE UP (ref 25–125)
RETICS/RBC NFR: 0.8 % — SIGNIFICANT CHANGE UP (ref 0.5–2.5)
WBC # BLD: 5.99 K/UL — SIGNIFICANT CHANGE UP (ref 3.8–10.5)
WBC # BLD: 6.37 K/UL — SIGNIFICANT CHANGE UP (ref 3.8–10.5)
WBC # FLD AUTO: 5.99 K/UL — SIGNIFICANT CHANGE UP (ref 3.8–10.5)
WBC # FLD AUTO: 6.37 K/UL — SIGNIFICANT CHANGE UP (ref 3.8–10.5)

## 2019-04-18 PROCEDURE — 83010 ASSAY OF HAPTOGLOBIN QUANT: CPT

## 2019-04-18 PROCEDURE — 85730 THROMBOPLASTIN TIME PARTIAL: CPT

## 2019-04-18 PROCEDURE — 83615 LACTATE (LD) (LDH) ENZYME: CPT

## 2019-04-18 PROCEDURE — 36415 COLL VENOUS BLD VENIPUNCTURE: CPT

## 2019-04-18 PROCEDURE — P9016: CPT

## 2019-04-18 PROCEDURE — 99285 EMERGENCY DEPT VISIT HI MDM: CPT | Mod: 25

## 2019-04-18 PROCEDURE — 85045 AUTOMATED RETICULOCYTE COUNT: CPT

## 2019-04-18 PROCEDURE — 99239 HOSP IP/OBS DSCHRG MGMT >30: CPT | Mod: GC

## 2019-04-18 PROCEDURE — 85027 COMPLETE CBC AUTOMATED: CPT

## 2019-04-18 PROCEDURE — 84702 CHORIONIC GONADOTROPIN TEST: CPT

## 2019-04-18 PROCEDURE — 85610 PROTHROMBIN TIME: CPT

## 2019-04-18 PROCEDURE — 85025 COMPLETE CBC W/AUTO DIFF WBC: CPT

## 2019-04-18 PROCEDURE — 80053 COMPREHEN METABOLIC PANEL: CPT

## 2019-04-18 PROCEDURE — 86923 COMPATIBILITY TEST ELECTRIC: CPT

## 2019-04-18 PROCEDURE — 82607 VITAMIN B-12: CPT

## 2019-04-18 PROCEDURE — 82746 ASSAY OF FOLIC ACID SERUM: CPT

## 2019-04-18 PROCEDURE — 93005 ELECTROCARDIOGRAM TRACING: CPT

## 2019-04-18 PROCEDURE — 36430 TRANSFUSION BLD/BLD COMPNT: CPT

## 2019-04-18 PROCEDURE — 82962 GLUCOSE BLOOD TEST: CPT

## 2019-04-18 RX ADMIN — Medication 300 MILLIGRAM(S): at 13:29

## 2019-04-18 NOTE — DISCHARGE NOTE PROVIDER - NSDCCPCAREPLAN_GEN_ALL_CORE_FT
PRINCIPAL DISCHARGE DIAGNOSIS  Diagnosis: Symptomatic anemia  Assessment and Plan of Treatment: You were found to have a low blood concentration or anemia on admission. This anemia is most likely due to iron deficiency due to your history of uterine fibroids. You required 2 red blood cell transfusions. Iron is important because it is part of your blood and helps carry oxygen throughout your body. Please follow up with your hematologist for need of further iron infusions        SECONDARY DISCHARGE DIAGNOSES  Diagnosis: Uterine fibroid  Assessment and Plan of Treatment: Your history of fibroids contributed to your anemia. It is imperative you meet with an obgyn doctor to manage your fibroids because constant transfusions increases your risk of an adverse reaction.

## 2019-04-18 NOTE — DISCHARGE NOTE NURSING/CASE MANAGEMENT/SOCIAL WORK - NSDCFUADDAPPT_GEN_ALL_CORE_FT
Please follow up with Dr. Gibbs on 4/29/19 at 2;30pm. You will need a referral from a PCP to do so. Please call HIP to help you find a PCP. Referral number: [id number] (Provider Relations Identification System #)  Please make a call with the [facility] for a future appointment.

## 2019-04-18 NOTE — DISCHARGE NOTE PROVIDER - CARE PROVIDER_API CALL
Deep Gibbs MD,   [address] · [phone]  Phone: (   )    -  Fax: (   )    -  Follow Up Time:     [facility],   [phone]  Phone: (   )    -  Fax: (   )    -  Follow Up Time:

## 2019-04-18 NOTE — DISCHARGE NOTE PROVIDER - PROVIDER TOKENS
FREE:[LAST:[Deep Gibbs MD],PHONE:[(   )    -],FAX:[(   )    -],ADDRESS:[[address] [place] [address] · [phone]]],FREE:[LAST:[[facility]],PHONE:[(   )    -],FAX:[(   )    -],ADDRESS:[phone]]]

## 2019-04-18 NOTE — DISCHARGE NOTE PROVIDER - NSDCQMSTAIRS_GEN_ALL_CORE
No "I personally performed the services described in the documentation  recorded by the scribe in my presence, and it accurately and completely records my words and action.”

## 2019-04-18 NOTE — DISCHARGE NOTE PROVIDER - NSDCFUADDAPPT_GEN_ALL_CORE_FT
Please follow up with Dr. Gibbs on 4/29/19 at 2;30pm. You will need a referral from a PCP to do so. Please call HIP to help you find a PCP. Referral number: [id number] (Provider Relations Identification System #)  Please make a call with the [facility] for a future appointment.

## 2019-04-18 NOTE — DISCHARGE NOTE NURSING/CASE MANAGEMENT/SOCIAL WORK - NSDCDPATPORTLINK_GEN_ALL_CORE
You can access the [facility] Portal, offered by [facility], by registering with the following website: [email or web address]

## 2019-04-19 LAB
FOLATE SERPL-MCNC: 13.8 NG/ML — SIGNIFICANT CHANGE UP
VIT B12 SERPL-MCNC: 294 PG/ML — SIGNIFICANT CHANGE UP (ref 232–1245)

## 2019-04-21 ENCOUNTER — MOBILE ON CALL (OUTPATIENT)
Age: 44
End: 2019-04-21

## 2019-04-22 PROBLEM — D21.9 BENIGN NEOPLASM OF CONNECTIVE AND OTHER SOFT TISSUE, UNSPECIFIED: Chronic | Status: ACTIVE | Noted: 2019-04-17

## 2019-04-23 ENCOUNTER — APPOINTMENT (OUTPATIENT)
Dept: INTERNAL MEDICINE | Facility: CLINIC | Age: 44
End: 2019-04-23
Payer: COMMERCIAL

## 2019-04-23 VITALS
DIASTOLIC BLOOD PRESSURE: 88 MMHG | TEMPERATURE: 98.8 F | HEART RATE: 89 BPM | WEIGHT: 218 LBS | BODY MASS INDEX: 33.04 KG/M2 | HEIGHT: 68 IN | OXYGEN SATURATION: 98 % | SYSTOLIC BLOOD PRESSURE: 152 MMHG

## 2019-04-23 DIAGNOSIS — Z86.69 PERSONAL HISTORY OF OTHER DISEASES OF THE NERVOUS SYSTEM AND SENSE ORGANS: ICD-10-CM

## 2019-04-23 PROCEDURE — 99496 TRANSJ CARE MGMT HIGH F2F 7D: CPT | Mod: GC

## 2019-04-24 DIAGNOSIS — D64.9 ANEMIA, UNSPECIFIED: ICD-10-CM

## 2019-04-24 DIAGNOSIS — D25.9 LEIOMYOMA OF UTERUS, UNSPECIFIED: ICD-10-CM

## 2019-04-24 DIAGNOSIS — D50.0 IRON DEFICIENCY ANEMIA SECONDARY TO BLOOD LOSS (CHRONIC): ICD-10-CM

## 2019-04-24 DIAGNOSIS — N92.0 EXCESSIVE AND FREQUENT MENSTRUATION WITH REGULAR CYCLE: ICD-10-CM

## 2019-06-14 ENCOUNTER — MED ADMIN CHARGE (OUTPATIENT)
Age: 44
End: 2019-06-14

## 2019-06-14 ENCOUNTER — APPOINTMENT (OUTPATIENT)
Dept: INTERNAL MEDICINE | Facility: CLINIC | Age: 44
End: 2019-06-14
Payer: COMMERCIAL

## 2019-06-14 VITALS
HEIGHT: 68 IN | TEMPERATURE: 100 F | OXYGEN SATURATION: 100 % | BODY MASS INDEX: 33.65 KG/M2 | SYSTOLIC BLOOD PRESSURE: 111 MMHG | WEIGHT: 222 LBS | DIASTOLIC BLOOD PRESSURE: 70 MMHG | HEART RATE: 69 BPM

## 2019-06-14 DIAGNOSIS — Z23 ENCOUNTER FOR IMMUNIZATION: ICD-10-CM

## 2019-06-14 DIAGNOSIS — D64.9 ANEMIA, UNSPECIFIED: ICD-10-CM

## 2019-06-14 PROCEDURE — 90715 TDAP VACCINE 7 YRS/> IM: CPT

## 2019-06-14 PROCEDURE — G0442 ANNUAL ALCOHOL SCREEN 15 MIN: CPT

## 2019-06-14 PROCEDURE — 99214 OFFICE O/P EST MOD 30 MIN: CPT | Mod: 25,GC

## 2019-06-14 PROCEDURE — G0444 DEPRESSION SCREEN ANNUAL: CPT

## 2019-06-14 PROCEDURE — 90471 IMMUNIZATION ADMIN: CPT

## 2019-06-14 PROCEDURE — 36415 COLL VENOUS BLD VENIPUNCTURE: CPT

## 2019-06-14 PROCEDURE — G0447 BEHAVIOR COUNSEL OBESITY 15M: CPT

## 2019-06-18 LAB
CHOLEST SERPL-MCNC: 123 MG/DL
CHOLEST/HDLC SERPL: 2.4 RATIO
HDLC SERPL-MCNC: 51 MG/DL
LDLC SERPL CALC-MCNC: 60 MG/DL
MEV IGG FLD QL IA: 74.3 AU/ML
MEV IGG+IGM SER-IMP: POSITIVE
MUV AB SER-ACNC: NORMAL
MUV IGG SER QL IA: 9.2 AU/ML
RUBV IGG FLD-ACNC: 2.6 INDEX
RUBV IGG SER-IMP: POSITIVE
TRIGL SERPL-MCNC: 60 MG/DL
TSH SERPL-ACNC: 1.06 UIU/ML

## 2019-08-23 ENCOUNTER — EMERGENCY (EMERGENCY)
Facility: HOSPITAL | Age: 44
LOS: 1 days | Discharge: HOME | End: 2019-08-23
Attending: EMERGENCY MEDICINE | Admitting: EMERGENCY MEDICINE
Payer: COMMERCIAL

## 2019-08-23 VITALS
SYSTOLIC BLOOD PRESSURE: 125 MMHG | DIASTOLIC BLOOD PRESSURE: 83 MMHG | RESPIRATION RATE: 20 BRPM | OXYGEN SATURATION: 99 % | TEMPERATURE: 99 F | HEIGHT: 68 IN | WEIGHT: 220.02 LBS | HEART RATE: 84 BPM

## 2019-08-23 LAB
ALBUMIN SERPL ELPH-MCNC: 4.2 G/DL — SIGNIFICANT CHANGE UP (ref 3.3–5)
ALP SERPL-CCNC: 73 U/L — SIGNIFICANT CHANGE UP (ref 40–120)
ALT FLD-CCNC: 12 U/L — SIGNIFICANT CHANGE UP (ref 10–45)
ANION GAP SERPL CALC-SCNC: 15 MMOL/L — SIGNIFICANT CHANGE UP (ref 5–17)
AST SERPL-CCNC: 17 U/L — SIGNIFICANT CHANGE UP (ref 10–40)
BASOPHILS # BLD AUTO: 0.05 K/UL — SIGNIFICANT CHANGE UP (ref 0–0.2)
BASOPHILS NFR BLD AUTO: 0.6 % — SIGNIFICANT CHANGE UP (ref 0–2)
BILIRUB SERPL-MCNC: 0.2 MG/DL — SIGNIFICANT CHANGE UP (ref 0.2–1.2)
BUN SERPL-MCNC: 6 MG/DL — LOW (ref 7–23)
CALCIUM SERPL-MCNC: 10.2 MG/DL — SIGNIFICANT CHANGE UP (ref 8.4–10.5)
CHLORIDE SERPL-SCNC: 104 MMOL/L — SIGNIFICANT CHANGE UP (ref 96–108)
CO2 SERPL-SCNC: 23 MMOL/L — SIGNIFICANT CHANGE UP (ref 22–31)
CREAT SERPL-MCNC: 0.68 MG/DL — SIGNIFICANT CHANGE UP (ref 0.5–1.3)
EOSINOPHIL # BLD AUTO: 0.08 K/UL — SIGNIFICANT CHANGE UP (ref 0–0.5)
EOSINOPHIL NFR BLD AUTO: 1 % — SIGNIFICANT CHANGE UP (ref 0–6)
GLUCOSE SERPL-MCNC: 103 MG/DL — HIGH (ref 70–99)
HCT VFR BLD CALC: 33.7 % — LOW (ref 34.5–45)
HGB BLD-MCNC: 9.2 G/DL — LOW (ref 11.5–15.5)
IMM GRANULOCYTES NFR BLD AUTO: 0.2 % — SIGNIFICANT CHANGE UP (ref 0–1.5)
LYMPHOCYTES # BLD AUTO: 1.95 K/UL — SIGNIFICANT CHANGE UP (ref 1–3.3)
LYMPHOCYTES # BLD AUTO: 23.7 % — SIGNIFICANT CHANGE UP (ref 13–44)
MCHC RBC-ENTMCNC: 21.6 PG — LOW (ref 27–34)
MCHC RBC-ENTMCNC: 27.3 GM/DL — LOW (ref 32–36)
MCV RBC AUTO: 79.1 FL — LOW (ref 80–100)
MONOCYTES # BLD AUTO: 0.36 K/UL — SIGNIFICANT CHANGE UP (ref 0–0.9)
MONOCYTES NFR BLD AUTO: 4.4 % — SIGNIFICANT CHANGE UP (ref 2–14)
NEUTROPHILS # BLD AUTO: 5.78 K/UL — SIGNIFICANT CHANGE UP (ref 1.8–7.4)
NEUTROPHILS NFR BLD AUTO: 70.1 % — SIGNIFICANT CHANGE UP (ref 43–77)
NRBC # BLD: 0 /100 WBCS — SIGNIFICANT CHANGE UP (ref 0–0)
PLATELET # BLD AUTO: 267 K/UL — SIGNIFICANT CHANGE UP (ref 150–400)
POTASSIUM SERPL-MCNC: 3.8 MMOL/L — SIGNIFICANT CHANGE UP (ref 3.5–5.3)
POTASSIUM SERPL-SCNC: 3.8 MMOL/L — SIGNIFICANT CHANGE UP (ref 3.5–5.3)
PROT SERPL-MCNC: 8.8 G/DL — HIGH (ref 6–8.3)
RBC # BLD: 4.26 M/UL — SIGNIFICANT CHANGE UP (ref 3.8–5.2)
RBC # FLD: 19.6 % — HIGH (ref 10.3–14.5)
SODIUM SERPL-SCNC: 142 MMOL/L — SIGNIFICANT CHANGE UP (ref 135–145)
WBC # BLD: 8.24 K/UL — SIGNIFICANT CHANGE UP (ref 3.8–10.5)
WBC # FLD AUTO: 8.24 K/UL — SIGNIFICANT CHANGE UP (ref 3.8–10.5)

## 2019-08-23 PROCEDURE — 99283 EMERGENCY DEPT VISIT LOW MDM: CPT | Mod: 25

## 2019-08-23 PROCEDURE — 93005 ELECTROCARDIOGRAM TRACING: CPT

## 2019-08-23 PROCEDURE — 36415 COLL VENOUS BLD VENIPUNCTURE: CPT

## 2019-08-23 PROCEDURE — 85025 COMPLETE CBC W/AUTO DIFF WBC: CPT

## 2019-08-23 PROCEDURE — 93010 ELECTROCARDIOGRAM REPORT: CPT

## 2019-08-23 PROCEDURE — 99284 EMERGENCY DEPT VISIT MOD MDM: CPT

## 2019-08-23 PROCEDURE — 80053 COMPREHEN METABOLIC PANEL: CPT

## 2019-08-23 NOTE — ED PROVIDER NOTE - OBJECTIVE STATEMENT
43 y/o female with a PMHx of uterine fibroids, anemia (iron/blood transfusions) is present in the ER c/o feeling slightly "nervous/anxious" that started around 9am. Pt states the symptoms started shortly after consuming coffee in which is not her usual practice. She felt so nervous she went home. Upon further questioning pt admitted that her nervousness wasn't in relation to the coffee, however was due to ongoing stress that she has been experiencing regarding her fibroids and advising for possible hysterectomy due to her constant vaginal bleeding and having to get iron/blood transfusions. Pt states she does not have anything at home to be open and talk about her health problems. Pt is concerned because she wants to have children. She denies the following: dizziness, HA, confusion, sob, abdominal/pelvic pain, chest pain, back pain, urinary symptoms. Pt denies hx of travel/smoking.

## 2019-08-23 NOTE — ED PROVIDER NOTE - CARE PROVIDER_API CALL
Francie Theodore)  Obstetrics and Gynecology  [address], [facility] [place] [address]  Phone: [phone]  Fax: [phone]  Follow Up Time:

## 2019-08-23 NOTE — ED PROVIDER NOTE - ATTENDING CONTRIBUTION TO CARE
44M menorrhagia 2/2 uterine fibroids requiring prbc transfusions now c/o anxiety around poss imminent hysterectomy and no longer being able to have children. no systemic sx. no active cp. no acute resp distress. no e/o infection. stable hb. other labs wnl. no indication for further mott or inpatient evaluation at this time. will dc home w/ outpatient SW for counseling services referral, strict return precautions. pt agrees w/ plan. questions answered.     I saw and discussed the care of the pt directly with the ACP while the pt was in the ED. i have reviewed the ACP note and agree w/ the history, exam and plan of care other than as noted above.

## 2019-08-23 NOTE — ED PROVIDER NOTE - NSFOLLOWUPINSTRUCTIONS_ED_ALL_ED_FT
Uterine Fibroids  Image   Uterine fibroids (leiomyomas) are noncancerous (benign) tumors that can develop in the uterus. Fibroids may also develop in the fallopian tubes, cervix, or tissues (ligaments) near the uterus.    You may have one or many fibroids. Fibroids vary in size, weight, and where they grow in the uterus. Some can become quite large. Most fibroids do not require medical treatment.    What are the causes?  The cause of this condition is not known.    What increases the risk?  You are more likely to develop this condition if you:  Are in your 30s or 40s and have not gone through menopause.  Have a family history of this condition.  Are of -American descent.  Had your first period at an early age (early menarche).  Have not had any children (nulliparity).  Are overweight or obese.  What are the signs or symptoms?  Many women do not have any symptoms. Symptoms of this condition may include:  Heavy menstrual bleeding.  Bleeding or spotting between periods.  Pain and pressure in the pelvic area, between the hips.  Bladder problems, such as needing to urinate urgently or more often than usual.  Inability to have children (infertility).  Failure to carry pregnancy to term (miscarriage).  How is this diagnosed?  This condition may be diagnosed based on:  Your symptoms and medical history.  A physical exam.  A pelvic exam that includes feeling for any tumors.  Imaging tests, such as ultrasound or MRI.  How is this treated?  Treatment for this condition may include:  Seeing your health care provider for follow-up visits to monitor your fibroids for any changes.  Taking NSAIDs such as ibuprofen, naproxen, or aspirin to reduce pain.  Hormone medicines. These may be taken as a pill, given in an injection, or delivered by a T-shaped device that is inserted into the uterus (intrauterine device, IUD).  Surgery to remove one of the following:  The fibroids (myomectomy). Your health care provider may recommend this if fibroids affect your fertility and you want to become pregnant.  The uterus (hysterectomy).  Blood supply to the fibroids (uterine artery embolization).  Follow these instructions at home:  Take over-the-counter and prescription medicines only as told by your health care provider.  Ask your health care provider if you should take iron pills or eat more iron-rich foods, such as dark green, leafy vegetables. Heavy menstrual bleeding can cause low iron levels.  If directed, apply heat to your back or abdomen to reduce pain. Use the heat source that your health care provider recommends, such as a moist heat pack or a heating pad.  Place a towel between your skin and the heat source.  Leave the heat on for 20–30 minutes.  Remove the heat if your skin turns bright red. This is especially important if you are unable to feel pain, heat, or cold. You may have a greater risk of getting burned.  Pay close attention to your menstrual cycle. Tell your health care provider about any changes, such as:  Increased blood flow that requires you to use more pads or tampons than usual.  A change in the number of days that your period lasts.  A change in symptoms that are associated with your period, such as back pain or cramps in your abdomen.  Keep all follow-up visits as told by your health care provider. This is important, especially if your fibroids need to be monitored for any changes.  Contact a health care provider if you:  Have pelvic pain, back pain, or cramps in your abdomen that do not get better with medicine or heat.  Develop new bleeding between periods.  Have increased bleeding during or between periods.  Feel unusually tired or weak.  Feel light-headed.  Get help right away if you:  Faint.  Have pelvic pain that suddenly gets worse.  Have severe vaginal bleeding that soaks a tampon or pad in 30 minutes or less.  Summary  Uterine fibroids are noncancerous (benign) tumors that can develop in the uterus.  The exact cause of this condition is not known.  Most fibroids do not require medical treatment unless they affect your ability to have children (fertility).  Contact a health care provider if you have pelvic pain, back pain, or cramps in your abdomen that do not get better with medicines.  Make sure you know what symptoms should cause you to get help right away.  This information is not intended to replace advice given to you by your health care provider. Make sure you discuss any questions you have with your health care provider.    Document Released: 12/15/2001 Document Revised: 11/13/2018 Document Reviewed: 11/13/2018  [employer] Interactive Patient Education © 2019 [employer]

## 2019-08-23 NOTE — ED ADULT NURSE NOTE - OBJECTIVE STATEMENT
pt received sitting in chair, A&O x 3. hx anemia. pt arrived with c/o anxiety from recent life changes. Denies n/v/d,fever, CP or SOB. NAD noted at this time, will continue to monitor.

## 2019-08-23 NOTE — ED PROVIDER NOTE - NS ED MD DISPO DISCHARGE
Dr Olimpia Medina contacted, relayed cxr result. Informed of urology Dc order & that pt wants to go home tonight. Pt still with o2 sat dropping into 80's at times on room air.  Per  Dr Olimpia Medina, pt not discharged medically until pt is seen by hospitalist Home

## 2019-08-23 NOTE — ED PROVIDER NOTE - CLINICAL SUMMARY MEDICAL DECISION MAKING FREE TEXT BOX
45 y/o female here c/o having anxiety about her possibly having to have a hysterectomy due to her fibroids which has been causing her anemia. Pt here with mildly depress symptoms however does not appear acutely psychotic without SI or hx of such. Do not suspect pe/cardiac, no active bleeding. VS nml. EKG nml. HDS with normal labs. Will refer SW for counseling services. Pt was able to voice her concerns and feels better after being able to speak about her anxiety about her health.

## 2019-08-28 ENCOUNTER — APPOINTMENT (OUTPATIENT)
Dept: INTERNAL MEDICINE | Facility: CLINIC | Age: 44
End: 2019-08-28
Payer: COMMERCIAL

## 2019-08-28 ENCOUNTER — LABORATORY RESULT (OUTPATIENT)
Age: 44
End: 2019-08-28

## 2019-08-28 DIAGNOSIS — E66.9 OBESITY, UNSPECIFIED: ICD-10-CM

## 2019-08-28 PROCEDURE — 99214 OFFICE O/P EST MOD 30 MIN: CPT | Mod: 25,GC

## 2019-08-28 PROCEDURE — 36415 COLL VENOUS BLD VENIPUNCTURE: CPT

## 2019-08-29 DIAGNOSIS — F41.9 ANXIETY DISORDER, UNSPECIFIED: ICD-10-CM

## 2019-08-29 DIAGNOSIS — Z88.6 ALLERGY STATUS TO ANALGESIC AGENT: ICD-10-CM

## 2019-08-29 DIAGNOSIS — Z79.899 OTHER LONG TERM (CURRENT) DRUG THERAPY: ICD-10-CM

## 2019-08-29 LAB
BASOPHILS # BLD AUTO: 0.05 K/UL
BASOPHILS NFR BLD AUTO: 0.6 %
EOSINOPHIL # BLD AUTO: 0.04 K/UL
EOSINOPHIL NFR BLD AUTO: 0.5 %
ESTIMATED AVERAGE GLUCOSE: 103 MG/DL
HBA1C MFR BLD HPLC: 5.2 %
HCT VFR BLD CALC: 36.5 %
HGB BLD-MCNC: 9.8 G/DL
IMM GRANULOCYTES NFR BLD AUTO: 0.1 %
LYMPHOCYTES # BLD AUTO: 2.2 K/UL
LYMPHOCYTES NFR BLD AUTO: 28.6 %
MAN DIFF?: NORMAL
MCHC RBC-ENTMCNC: 21.7 PG
MCHC RBC-ENTMCNC: 26.8 GM/DL
MCV RBC AUTO: 80.8 FL
MONOCYTES # BLD AUTO: 0.57 K/UL
MONOCYTES NFR BLD AUTO: 7.4 %
NEUTROPHILS # BLD AUTO: 4.83 K/UL
NEUTROPHILS NFR BLD AUTO: 62.8 %
PLATELET # BLD AUTO: 247 K/UL
RBC # BLD: 4.52 M/UL
RBC # FLD: 20.9 %
WBC # FLD AUTO: 7.7 K/UL

## 2019-09-04 ENCOUNTER — APPOINTMENT (OUTPATIENT)
Dept: INTERNAL MEDICINE | Facility: CLINIC | Age: 44
End: 2019-09-04

## 2019-09-07 VITALS — DIASTOLIC BLOOD PRESSURE: 72 MMHG | HEART RATE: 64 BPM | RESPIRATION RATE: 16 BRPM | SYSTOLIC BLOOD PRESSURE: 114 MMHG

## 2019-09-07 NOTE — PHYSICAL EXAM
[Grossly Normal Strength/Tone] : grossly normal strength/tone [Normal] : normal rate, regular rhythm, normal S1 and S2 and no murmur heard [Normal Gait] : normal gait [Speech Grossly Normal] : speech grossly normal [Memory Grossly Normal] : memory grossly normal [Alert and Oriented x3] : oriented to person, place, and time [Normal Insight/Judgement] : insight and judgment were intact [Normal Rate] : normal rate  [Normal S1, S2] : normal S1 and S2 [Regular Rhythm] : with a regular rhythm [No Murmur] : no murmur heard [de-identified] : sl pale conjunctiva [de-identified] : pt is tearful for much of the visit. Eventually calm and quite talkative.  [de-identified] : BP: 114/72  Pulse: 64, regular

## 2019-09-07 NOTE — COUNSELING
[Behavioral health counseling provided] : Behavioral health counseling provided [FreeTextEntry2] : focus was on fe and low carb related nutrition

## 2019-09-07 NOTE — ASSESSMENT
[FreeTextEntry1] : 45yo woman was referred for a wellness visit, to address nutrition and exercise, but she was tearful for much of the visit, with a need to talk about her work and family related distress. We eventually did address some nutritional concerns, mostly as they relate to her chronic fe deficiency.\par \par Chronic Fe def anemia, due to uterine fibroids, with recent ER visit with Hgb of 9\par  ordered a "baseline" CBC for her new PCP\par     strongly advised the following dietary additions:\par    -use vit c tablets when taking po fe instead of drinking sooo much OJ\par    - eat more eggs\par    - daily green leafy vegetables\par    -fish 3-4 days/wk\par \par Obese and with concern re diabetes\par   ordered a HgbA1c\par   will discuss carb reduction and exercise next visit\par \par Tearful with long overdue need to address grief and loss - associated with mother's death, with an , etc, etc, etc!!!\par focused today on meditative breathing, to relieve her immediate tension\par assured her that we, in this office, will provide a safe place for her to start to open up to her fears and losses as well as to her physical health challenges.\par referred to , Michelle Yeo\par \par rtc prn and in 1wk\par \par addendum: HgbA1c - 5.3\par she told me that she attributes tearfulness and moodiness to being premenstrual\par I still encouraged f/u with

## 2019-09-07 NOTE — HISTORY OF PRESENT ILLNESS
[de-identified] : This 43yo woman was referred to address wt loss, but\par \par Patient had barely gotten comfortable in the chair in my office before her tears start to flow.\par Has no suicidal ideation and doesn't think of herself as depressed...but now knows that shopping will no longer work to make her happy!\par \par She tells me the following:\par \par Returned 1-2wks ago from a good 2wk vacation in [facility]\par Had a "sl meltdown" at work\par had ALOT of work dumped on her desk upon return from vacation. meanwhile, nobody else is doing hteir work. \par has worked for [employer] for 15yrs. In a new office for 2yrs. doesn't now and never has gotten along with co-workers. She feels like an outsider. Is very diligent with her work..much more so than others.\par Has had a chronically upset stomach for a few days\par \par Mother  of from lupus at age 45. Diya was 15. NObody ever talked about her mother's death...or asked Diya how she was.\par Since then, has never asked for help. Assumed she was supposed to keep every feeling inside.\par 47yo sister has lupus.\par Diya not aware of having had a lupus blood test\par     has no related symptoms\par \par Chronic fe def. Has been seeing a hematologist, Dr. Gibbs, for many years\par Was at [facility] on . Hgb was 9. She doesn't know what her baseline is.\par Fe def anemia, with hx multiple transfusions\par Has known uterine fibroids\par Has routine iron infusions\par takes po liquid iron. drinks lots of OJ\par last transfusion was in \par \par Had a termination of pregnancy at age 26\par No other pregnancies\par Is presently in an intimate relationship. last SA few days ago.\par Using condoms for STI prevention\par \par Re her Wt and Nutrition: \par   didn't get to details, other than she doesn't think that she is overwt nor is she interested in losing wt or exercising more.\par  she does not presently attnd to incr nutritional iron\par \par Is amenable to seeing a therapist. Knows that she needs someone to talk to...in addhilary to her brother   [FreeTextEntry1] : 43yo woman rtc for initial wellness visit

## 2019-09-20 ENCOUNTER — APPOINTMENT (OUTPATIENT)
Dept: INTERNAL MEDICINE | Facility: CLINIC | Age: 44
End: 2019-09-20
Payer: COMMERCIAL

## 2019-09-20 VITALS
DIASTOLIC BLOOD PRESSURE: 90 MMHG | HEIGHT: 68 IN | TEMPERATURE: 99.3 F | BODY MASS INDEX: 32.43 KG/M2 | SYSTOLIC BLOOD PRESSURE: 147 MMHG | OXYGEN SATURATION: 100 % | WEIGHT: 214 LBS | HEART RATE: 68 BPM

## 2019-09-20 LAB
CONTROL LINE: PRESENT
HIV 1+2 AB+HIV1P24 AG SERPLBLD IA.RAPID: NEGATIVE
HIV-1 / HIV-2 ANTIBODY: NORMAL
HIV1 P24 AG SERPL IA-MCNC: NORMAL

## 2019-09-20 PROCEDURE — 99214 OFFICE O/P EST MOD 30 MIN: CPT | Mod: 25,GC

## 2019-09-20 PROCEDURE — 86701 HIV-1ANTIBODY: CPT | Mod: QW

## 2019-10-16 NOTE — ED PROVIDER NOTE - NS ED MD DISPO ADMIT [facility] PALLIATIVE CARE
Pt daiana robbinsl at first has responded well to pain medication Is able to use suction for mouth secretions/sputum which is diminishing Rt neck  Incision dessing needing to be changed regularlyr/t Penrose drains. Ready to transfer..         NONE

## 2019-10-29 ENCOUNTER — APPOINTMENT (OUTPATIENT)
Dept: INTERNAL MEDICINE | Facility: CLINIC | Age: 44
End: 2019-10-29
Payer: COMMERCIAL

## 2019-10-29 VITALS
TEMPERATURE: 98.6 F | HEART RATE: 71 BPM | HEIGHT: 68 IN | WEIGHT: 209 LBS | OXYGEN SATURATION: 99 % | SYSTOLIC BLOOD PRESSURE: 124 MMHG | DIASTOLIC BLOOD PRESSURE: 79 MMHG | BODY MASS INDEX: 31.67 KG/M2

## 2019-10-29 DIAGNOSIS — F32.2 MAJOR DEPRESSIVE DISORDER, SINGLE EPISODE, SEVERE W/OUT PSYCHOTIC FEATURES: ICD-10-CM

## 2019-10-29 DIAGNOSIS — Z11.4 ENCOUNTER FOR SCREENING FOR HUMAN IMMUNODEFICIENCY VIRUS [HIV]: ICD-10-CM

## 2019-10-29 DIAGNOSIS — F41.1 GENERALIZED ANXIETY DISORDER: ICD-10-CM

## 2019-10-29 DIAGNOSIS — R03.0 ELEVATED BLOOD-PRESSURE READING, W/OUT DIAGNOSIS OF HYPERTENSION: ICD-10-CM

## 2019-10-29 PROCEDURE — 99213 OFFICE O/P EST LOW 20 MIN: CPT | Mod: GC,25

## 2019-10-29 PROCEDURE — 36415 COLL VENOUS BLD VENIPUNCTURE: CPT

## 2019-10-30 LAB
BASOPHILS # BLD AUTO: 0.06 K/UL
BASOPHILS NFR BLD AUTO: 1.1 %
EOSINOPHIL # BLD AUTO: 0.07 K/UL
EOSINOPHIL NFR BLD AUTO: 1.3 %
HCT VFR BLD CALC: 33.2 %
HGB BLD-MCNC: 8.4 G/DL
IMM GRANULOCYTES NFR BLD AUTO: 0.5 %
LYMPHOCYTES # BLD AUTO: 1.6 K/UL
LYMPHOCYTES NFR BLD AUTO: 28.8 %
MAN DIFF?: NORMAL
MCHC RBC-ENTMCNC: 19.9 PG
MCHC RBC-ENTMCNC: 25.3 GM/DL
MCV RBC AUTO: 78.7 FL
MONOCYTES # BLD AUTO: 0.42 K/UL
MONOCYTES NFR BLD AUTO: 7.6 %
NEUTROPHILS # BLD AUTO: 3.37 K/UL
NEUTROPHILS NFR BLD AUTO: 60.7 %
PLATELET # BLD AUTO: 301 K/UL
RBC # BLD: 4.22 M/UL
RBC # FLD: 18.5 %
WBC # FLD AUTO: 5.55 K/UL

## 2019-11-04 ENCOUNTER — APPOINTMENT (OUTPATIENT)
Dept: OBGYN | Facility: CLINIC | Age: 44
End: 2019-11-04
Payer: COMMERCIAL

## 2019-11-04 VITALS
SYSTOLIC BLOOD PRESSURE: 130 MMHG | DIASTOLIC BLOOD PRESSURE: 80 MMHG | WEIGHT: 203 LBS | BODY MASS INDEX: 30.77 KG/M2 | HEIGHT: 68 IN

## 2019-11-04 DIAGNOSIS — N95.1 MENOPAUSAL AND FEMALE CLIMACTERIC STATES: ICD-10-CM

## 2019-11-04 PROCEDURE — 99386 PREV VISIT NEW AGE 40-64: CPT

## 2019-11-04 NOTE — PHYSICAL EXAM
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 to do a papsmear. [FreeTextEntry7] : unable to locate the adnexa due to enlarged uterus.

## 2019-11-04 NOTE — HISTORY OF PRESENT ILLNESS
[___ Year(s) Ago] : [unfilled] year(s) ago [Last Pap ___] : Last cervical pap smear was [unfilled] [Perimenopausal] : is perimenopausal [Sexually Active] : is sexually active [Definite:  ___ (Date)] : the last menstrual period was [unfilled] [Contraception] : does not use contraception

## 2019-11-08 ENCOUNTER — APPOINTMENT (OUTPATIENT)
Dept: INTERNAL MEDICINE | Facility: CLINIC | Age: 44
End: 2019-11-08

## 2019-11-13 ENCOUNTER — APPOINTMENT (OUTPATIENT)
Dept: INTERNAL MEDICINE | Facility: CLINIC | Age: 44
End: 2019-11-13

## 2019-11-19 ENCOUNTER — APPOINTMENT (OUTPATIENT)
Dept: OBGYN | Facility: CLINIC | Age: 44
End: 2019-11-19
Payer: COMMERCIAL

## 2019-11-19 VITALS
HEIGHT: 68 IN | BODY MASS INDEX: 31.22 KG/M2 | WEIGHT: 206 LBS | DIASTOLIC BLOOD PRESSURE: 82 MMHG | SYSTOLIC BLOOD PRESSURE: 123 MMHG

## 2019-11-19 DIAGNOSIS — D50.0 IRON DEFICIENCY ANEMIA SECONDARY TO BLOOD LOSS (CHRONIC): ICD-10-CM

## 2019-11-19 DIAGNOSIS — N92.0 EXCESSIVE AND FREQUENT MENSTRUATION WITH REGULAR CYCLE: ICD-10-CM

## 2019-11-19 PROCEDURE — 99214 OFFICE O/P EST MOD 30 MIN: CPT

## 2019-11-19 RX ORDER — TRANEXAMIC ACID 650 MG/1
650 TABLET ORAL 3 TIMES DAILY
Qty: 1 | Refills: 5 | Status: ACTIVE | COMMUNITY
Start: 2019-11-19 | End: 1900-01-01

## 2019-11-19 NOTE — HISTORY OF PRESENT ILLNESS
[Definite:  ___ (Date)] : the last menstrual period was [unfilled] [Normal Amount/Duration] : was of a normal amount and duration [Regular Cycle Intervals] : periods have been regular [Menarche Age: ____] : age at menarche was [unfilled] [Sexually Active] : is sexually active [Contraception] : uses contraception [Condoms] : uses condoms [Male ___] : [unfilled] male [Spotting Between  Menses] : no spotting between menses [Experiencing Menopausal Sxs] : not experiencing menopausal symptoms [Currently In Menopause] : not currently in menopause

## 2020-01-07 ENCOUNTER — APPOINTMENT (OUTPATIENT)
Dept: INTERVENTIONAL RADIOLOGY/VASCULAR | Facility: HOSPITAL | Age: 45
End: 2020-01-07
Payer: COMMERCIAL

## 2020-01-07 VITALS — HEART RATE: 72 BPM | SYSTOLIC BLOOD PRESSURE: 130 MMHG | RESPIRATION RATE: 20 BRPM | DIASTOLIC BLOOD PRESSURE: 78 MMHG

## 2020-01-07 DIAGNOSIS — Z72.89 OTHER PROBLEMS RELATED TO LIFESTYLE: ICD-10-CM

## 2020-01-07 PROCEDURE — 99204 OFFICE O/P NEW MOD 45 MIN: CPT

## 2020-01-07 NOTE — CONSULT LETTER
[Dear  ___] : Dear  [unfilled], [Consult Closing:] : Thank you very much for allowing me to participate in the care of this patient.  If you have any questions, please do not hesitate to contact me. [Consult Letter:] : I had the pleasure of evaluating your patient, [unfilled]. [Please see my note below.] : Please see my note below. [FreeTextEntry3] : Arcadio Kaur MD, FSIR\par Chief Interventional Radiology\par [facility]\par [facility]\par  [Sincerely,] : Sincerely,

## 2020-01-07 NOTE — HISTORY OF PRESENT ILLNESS
[FreeTextEntry1] : 44 year old woman with uterine fibroids first diagnosed 10 years ago now with menorrhagia 1-2 weeks sometimes experiencing two periods per month.  Anemia requiring blood transfusions in 2014, 2017 and 2019.  Also with urinary frequency and dyspareunia, cramps and pelvic heaviness.  MRI shows enlarged uterus 24 x 10.5 x 16.3 with several infarcted fibroids and several intracavitary small fibroids.  Gyn unable to perform pap smear. [Dysmenorrhea] : dysmenorrhea [Menorrhagia] : ~T menorrhagia [Pelvic Pain] : pelvic pain [Pressure] : pressure [Urinary Frequency] : urinary frequency [Stable] : stable

## 2020-01-07 NOTE — ASSESSMENT
[FreeTextEntry1] : 44 year old with fibroids and main complain of menorrhagia.  Discussed UAE procedure at length and patient understands limitation regarding bulk symptoms due to size of uterus.  Patient is scheduled for UAE in March 2020

## 2020-01-07 NOTE — PHYSICAL EXAM
[Alert] : alert [Well Developed] : well developed [Well Nourished] : well nourished [No Acute Distress] : no acute distress [No Proptosis] : no proptosis [EOMI] : extra occular movement intact [Normal Sclera/Conjunctiva] : normal sclera/conjunctiva [Normal Oropharynx] : the oropharynx was normal [Thyroid Not Enlarged] : the thyroid was not enlarged [No Thyroid Nodules] : there were no palpable thyroid nodules [No Accessory Muscle Use] : no accessory muscle use [No Respiratory Distress] : no respiratory distress [Normal S1, S2] : normal S1 and S2 [Clear to Auscultation] : lungs were clear to auscultation bilaterally [Normal Rate] : heart rate was normal  [No Edema] : there was no peripheral edema [Pedal Pulses Normal] : the pedal pulses are present [Regular Rhythm] : with a regular rhythm [Not Tender] : non-tender [Soft] : abdomen soft [Normal Bowel Sounds] : normal bowel sounds [Normal Anterior Cervical Nodes] : anterior cervical nodes [Spine Straight] : spine straight [No Spinal Tenderness] : no spinal tenderness [No Rash] : no rash [Normal Gait] : normal gait [Acanthosis Nigricans___] : no acanthosis nigricans [No Tremors] : no tremors [Oriented x3] : oriented to person, place, and time [Normal Affect] : the affect was normal [Normal Mood] : the mood was normal [de-identified] : uterus palpable above the umbilicus [Fully active, able to carry on all pre-disease performance without restriction] : Fully active, able to carry on all pre-disease performance without restriction

## 2020-02-10 ENCOUNTER — OUTPATIENT (OUTPATIENT)
Dept: OUTPATIENT SERVICES | Facility: HOSPITAL | Age: 45
LOS: 1 days | End: 2020-02-10
Payer: COMMERCIAL

## 2020-02-10 DIAGNOSIS — Z01.818 ENCOUNTER FOR OTHER PREPROCEDURAL EXAMINATION: ICD-10-CM

## 2020-02-10 LAB
ALBUMIN SERPL ELPH-MCNC: 4.2 G/DL — SIGNIFICANT CHANGE UP (ref 3.3–5)
ALP SERPL-CCNC: 60 U/L — SIGNIFICANT CHANGE UP (ref 40–120)
ALT FLD-CCNC: 9 U/L — LOW (ref 10–45)
ANION GAP SERPL CALC-SCNC: 12 MMOL/L — SIGNIFICANT CHANGE UP (ref 5–17)
APTT BLD: 29.7 SEC — SIGNIFICANT CHANGE UP (ref 27.5–36.3)
AST SERPL-CCNC: 12 U/L — SIGNIFICANT CHANGE UP (ref 10–40)
BILIRUB SERPL-MCNC: 0.2 MG/DL — SIGNIFICANT CHANGE UP (ref 0.2–1.2)
BUN SERPL-MCNC: 6 MG/DL — LOW (ref 7–23)
CALCIUM SERPL-MCNC: 9.6 MG/DL — SIGNIFICANT CHANGE UP (ref 8.4–10.5)
CHLORIDE SERPL-SCNC: 106 MMOL/L — SIGNIFICANT CHANGE UP (ref 96–108)
CO2 SERPL-SCNC: 24 MMOL/L — SIGNIFICANT CHANGE UP (ref 22–31)
CREAT SERPL-MCNC: 0.68 MG/DL — SIGNIFICANT CHANGE UP (ref 0.5–1.3)
GLUCOSE SERPL-MCNC: 94 MG/DL — SIGNIFICANT CHANGE UP (ref 70–99)
HCG SERPL-ACNC: <0 MIU/ML — SIGNIFICANT CHANGE UP
INR BLD: 1.14 — SIGNIFICANT CHANGE UP (ref 0.88–1.16)
POTASSIUM SERPL-MCNC: 4.5 MMOL/L — SIGNIFICANT CHANGE UP (ref 3.5–5.3)
POTASSIUM SERPL-SCNC: 4.5 MMOL/L — SIGNIFICANT CHANGE UP (ref 3.5–5.3)
PROT SERPL-MCNC: 7.6 G/DL — SIGNIFICANT CHANGE UP (ref 6–8.3)
PROTHROM AB SERPL-ACNC: 13.1 SEC — HIGH (ref 10–12.9)
SODIUM SERPL-SCNC: 142 MMOL/L — SIGNIFICANT CHANGE UP (ref 135–145)

## 2020-02-10 PROCEDURE — 85730 THROMBOPLASTIN TIME PARTIAL: CPT

## 2020-02-10 PROCEDURE — 85610 PROTHROMBIN TIME: CPT

## 2020-02-10 PROCEDURE — 80053 COMPREHEN METABOLIC PANEL: CPT

## 2020-02-10 PROCEDURE — 84702 CHORIONIC GONADOTROPIN TEST: CPT

## 2020-02-25 ENCOUNTER — OUTPATIENT (OUTPATIENT)
Dept: OUTPATIENT SERVICES | Facility: HOSPITAL | Age: 45
LOS: 1 days | End: 2020-02-25
Payer: COMMERCIAL

## 2020-02-25 DIAGNOSIS — D25.9 LEIOMYOMA OF UTERUS, UNSPECIFIED: ICD-10-CM

## 2020-02-25 LAB
BASOPHILS # BLD AUTO: 0.05 K/UL — SIGNIFICANT CHANGE UP (ref 0–0.2)
BASOPHILS NFR BLD AUTO: 0.7 % — SIGNIFICANT CHANGE UP (ref 0–2)
EOSINOPHIL # BLD AUTO: 0.18 K/UL — SIGNIFICANT CHANGE UP (ref 0–0.5)
EOSINOPHIL NFR BLD AUTO: 2.7 % — SIGNIFICANT CHANGE UP (ref 0–6)
HCT VFR BLD CALC: 34.2 % — LOW (ref 34.5–45)
HGB BLD-MCNC: 9.1 G/DL — LOW (ref 11.5–15.5)
IMM GRANULOCYTES NFR BLD AUTO: 0.6 % — SIGNIFICANT CHANGE UP (ref 0–1.5)
LYMPHOCYTES # BLD AUTO: 1.86 K/UL — SIGNIFICANT CHANGE UP (ref 1–3.3)
LYMPHOCYTES # BLD AUTO: 27.5 % — SIGNIFICANT CHANGE UP (ref 13–44)
MCHC RBC-ENTMCNC: 21.6 PG — LOW (ref 27–34)
MCHC RBC-ENTMCNC: 26.6 GM/DL — LOW (ref 32–36)
MCV RBC AUTO: 81.2 FL — SIGNIFICANT CHANGE UP (ref 80–100)
MONOCYTES # BLD AUTO: 0.33 K/UL — SIGNIFICANT CHANGE UP (ref 0–0.9)
MONOCYTES NFR BLD AUTO: 4.9 % — SIGNIFICANT CHANGE UP (ref 2–14)
NEUTROPHILS # BLD AUTO: 4.31 K/UL — SIGNIFICANT CHANGE UP (ref 1.8–7.4)
NEUTROPHILS NFR BLD AUTO: 63.6 % — SIGNIFICANT CHANGE UP (ref 43–77)
NRBC # BLD: 0 /100 WBCS — SIGNIFICANT CHANGE UP (ref 0–0)
PLATELET # BLD AUTO: 193 K/UL — SIGNIFICANT CHANGE UP (ref 150–400)
RBC # BLD: 4.21 M/UL — SIGNIFICANT CHANGE UP (ref 3.8–5.2)
RBC # FLD: 21.1 % — HIGH (ref 10.3–14.5)
WBC # BLD: 6.77 K/UL — SIGNIFICANT CHANGE UP (ref 3.8–10.5)
WBC # FLD AUTO: 6.77 K/UL — SIGNIFICANT CHANGE UP (ref 3.8–10.5)

## 2020-02-25 PROCEDURE — 85025 COMPLETE CBC W/AUTO DIFF WBC: CPT

## 2020-02-28 DIAGNOSIS — D25.9 LEIOMYOMA OF UTERUS, UNSPECIFIED: ICD-10-CM

## 2020-03-01 ENCOUNTER — FORM ENCOUNTER (OUTPATIENT)
Age: 45
End: 2020-03-01

## 2020-03-02 ENCOUNTER — APPOINTMENT (OUTPATIENT)
Dept: INTERVENTIONAL RADIOLOGY/VASCULAR | Facility: HOSPITAL | Age: 45
End: 2020-03-02
Payer: COMMERCIAL

## 2020-03-02 ENCOUNTER — OUTPATIENT (OUTPATIENT)
Dept: OUTPATIENT SERVICES | Facility: HOSPITAL | Age: 45
LOS: 1 days | End: 2020-03-02
Payer: COMMERCIAL

## 2020-03-02 PROCEDURE — C1894: CPT

## 2020-03-02 PROCEDURE — 36247 INS CATH ABD/L-EXT ART 3RD: CPT | Mod: 59

## 2020-03-02 PROCEDURE — 37243 VASC EMBOLIZE/OCCLUDE ORGAN: CPT

## 2020-03-02 PROCEDURE — C1769: CPT

## 2020-03-02 PROCEDURE — C1889: CPT

## 2020-03-02 PROCEDURE — C1887: CPT

## 2020-03-02 PROCEDURE — 36247 INS CATH ABD/L-EXT ART 3RD: CPT

## 2020-03-02 RX ORDER — IBUPROFEN 200 MG
1 TABLET ORAL
Qty: 40 | Refills: 0
Start: 2020-03-02 | End: 2020-03-11

## 2020-03-02 RX ORDER — ONDANSETRON 8 MG/1
1 TABLET, FILM COATED ORAL
Qty: 9 | Refills: 0
Start: 2020-03-02 | End: 2020-03-04

## 2020-03-02 RX ORDER — OXYCODONE HYDROCHLORIDE 5 MG/1
1 TABLET ORAL
Qty: 16 | Refills: 0
Start: 2020-03-02 | End: 2020-03-05

## 2020-03-23 ENCOUNTER — APPOINTMENT (OUTPATIENT)
Dept: INTERVENTIONAL RADIOLOGY/VASCULAR | Facility: HOSPITAL | Age: 45
End: 2020-03-23
Payer: COMMERCIAL

## 2020-03-23 PROCEDURE — XXXXX: CPT

## 2020-04-17 ENCOUNTER — APPOINTMENT (OUTPATIENT)
Dept: INTERNAL MEDICINE | Facility: CLINIC | Age: 45
End: 2020-04-17
Payer: SELF-PAY

## 2020-04-17 PROCEDURE — PCNS1: CPT

## 2020-07-02 ENCOUNTER — APPOINTMENT (OUTPATIENT)
Dept: INTERVENTIONAL RADIOLOGY/VASCULAR | Facility: HOSPITAL | Age: 45
End: 2020-07-02

## 2020-09-01 ENCOUNTER — APPOINTMENT (OUTPATIENT)
Dept: MRI IMAGING | Facility: HOSPITAL | Age: 45
End: 2020-09-01
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
## 2022-10-02 NOTE — ED ADULT TRIAGE NOTE - ARRIVAL INFO ADDITIONAL COMMENTS
No c.o "feeling anxious" after drinking one cup of coffee. pt denies any chest pain, palpitations, sob.

## 2023-07-08 VITALS
DIASTOLIC BLOOD PRESSURE: 79 MMHG | HEART RATE: 117 BPM | SYSTOLIC BLOOD PRESSURE: 148 MMHG | RESPIRATION RATE: 16 BRPM | WEIGHT: 229.94 LBS | HEIGHT: 68 IN | TEMPERATURE: 97 F | OXYGEN SATURATION: 97 %

## 2023-07-08 LAB
ALBUMIN SERPL ELPH-MCNC: 3.4 G/DL — SIGNIFICANT CHANGE UP (ref 3.3–5)
ALP SERPL-CCNC: 95 U/L — SIGNIFICANT CHANGE UP (ref 40–120)
ALT FLD-CCNC: 21 U/L — SIGNIFICANT CHANGE UP (ref 10–45)
ANION GAP SERPL CALC-SCNC: 16 MMOL/L — SIGNIFICANT CHANGE UP (ref 5–17)
ANISOCYTOSIS BLD QL: SLIGHT — SIGNIFICANT CHANGE UP
APPEARANCE UR: CLEAR — SIGNIFICANT CHANGE UP
AST SERPL-CCNC: 22 U/L — SIGNIFICANT CHANGE UP (ref 10–40)
BACTERIA # UR AUTO: PRESENT /HPF
BASOPHILS # BLD AUTO: 0.18 K/UL — SIGNIFICANT CHANGE UP (ref 0–0.2)
BASOPHILS NFR BLD AUTO: 0.9 % — SIGNIFICANT CHANGE UP (ref 0–2)
BILIRUB SERPL-MCNC: 0.3 MG/DL — SIGNIFICANT CHANGE UP (ref 0.2–1.2)
BILIRUB UR-MCNC: NEGATIVE — SIGNIFICANT CHANGE UP
BUN SERPL-MCNC: 4 MG/DL — LOW (ref 7–23)
CALCIUM SERPL-MCNC: 9.6 MG/DL — SIGNIFICANT CHANGE UP (ref 8.4–10.5)
CHLORIDE SERPL-SCNC: 102 MMOL/L — SIGNIFICANT CHANGE UP (ref 96–108)
CO2 SERPL-SCNC: 25 MMOL/L — SIGNIFICANT CHANGE UP (ref 22–31)
COLOR SPEC: YELLOW — SIGNIFICANT CHANGE UP
COMMENT - URINE: SIGNIFICANT CHANGE UP
CREAT SERPL-MCNC: 0.66 MG/DL — SIGNIFICANT CHANGE UP (ref 0.5–1.3)
DACRYOCYTES BLD QL SMEAR: SLIGHT — SIGNIFICANT CHANGE UP
DIFF PNL FLD: NEGATIVE — SIGNIFICANT CHANGE UP
EGFR: 109 ML/MIN/1.73M2 — SIGNIFICANT CHANGE UP
EOSINOPHIL # BLD AUTO: 0 K/UL — SIGNIFICANT CHANGE UP (ref 0–0.5)
EOSINOPHIL NFR BLD AUTO: 0 % — SIGNIFICANT CHANGE UP (ref 0–6)
EPI CELLS # UR: SIGNIFICANT CHANGE UP /HPF (ref 0–5)
GLUCOSE SERPL-MCNC: 113 MG/DL — HIGH (ref 70–99)
GLUCOSE UR QL: NEGATIVE — SIGNIFICANT CHANGE UP
HCG UR QL: NEGATIVE — SIGNIFICANT CHANGE UP
HCT VFR BLD CALC: 31.6 % — LOW (ref 34.5–45)
HGB BLD-MCNC: 8.5 G/DL — LOW (ref 11.5–15.5)
HYPOCHROMIA BLD QL: SIGNIFICANT CHANGE UP
KETONES UR-MCNC: ABNORMAL MG/DL
LEUKOCYTE ESTERASE UR-ACNC: ABNORMAL
LIDOCAIN IGE QN: 16 U/L — SIGNIFICANT CHANGE UP (ref 7–60)
LYMPHOCYTES # BLD AUTO: 11.3 % — LOW (ref 13–44)
LYMPHOCYTES # BLD AUTO: 2.22 K/UL — SIGNIFICANT CHANGE UP (ref 1–3.3)
MANUAL SMEAR VERIFICATION: SIGNIFICANT CHANGE UP
MCHC RBC-ENTMCNC: 19.1 PG — LOW (ref 27–34)
MCHC RBC-ENTMCNC: 26.9 GM/DL — LOW (ref 32–36)
MCV RBC AUTO: 70.9 FL — LOW (ref 80–100)
MICROCYTES BLD QL: SLIGHT — SIGNIFICANT CHANGE UP
MONOCYTES # BLD AUTO: 1.35 K/UL — HIGH (ref 0–0.9)
MONOCYTES NFR BLD AUTO: 6.9 % — SIGNIFICANT CHANGE UP (ref 2–14)
NEUTROPHILS # BLD AUTO: 15.86 K/UL — HIGH (ref 1.8–7.4)
NEUTROPHILS NFR BLD AUTO: 80.9 % — HIGH (ref 43–77)
NITRITE UR-MCNC: NEGATIVE — SIGNIFICANT CHANGE UP
OVALOCYTES BLD QL SMEAR: SLIGHT — SIGNIFICANT CHANGE UP
PH UR: 6.5 — SIGNIFICANT CHANGE UP (ref 5–8)
PLAT MORPH BLD: NORMAL — SIGNIFICANT CHANGE UP
PLATELET # BLD AUTO: 566 K/UL — HIGH (ref 150–400)
POIKILOCYTOSIS BLD QL AUTO: SLIGHT — SIGNIFICANT CHANGE UP
POTASSIUM SERPL-MCNC: 3.3 MMOL/L — LOW (ref 3.5–5.3)
POTASSIUM SERPL-SCNC: 3.3 MMOL/L — LOW (ref 3.5–5.3)
PROT SERPL-MCNC: 8.5 G/DL — HIGH (ref 6–8.3)
PROT UR-MCNC: 30 MG/DL
RBC # BLD: 4.46 M/UL — SIGNIFICANT CHANGE UP (ref 3.8–5.2)
RBC # FLD: 19.9 % — HIGH (ref 10.3–14.5)
RBC BLD AUTO: ABNORMAL
RBC CASTS # UR COMP ASSIST: < 5 /HPF — SIGNIFICANT CHANGE UP
SODIUM SERPL-SCNC: 143 MMOL/L — SIGNIFICANT CHANGE UP (ref 135–145)
SP GR SPEC: 1.01 — SIGNIFICANT CHANGE UP (ref 1–1.03)
UROBILINOGEN FLD QL: 2 E.U./DL
WBC # BLD: 19.61 K/UL — HIGH (ref 3.8–10.5)
WBC # FLD AUTO: 19.61 K/UL — HIGH (ref 3.8–10.5)
WBC UR QL: ABNORMAL /HPF

## 2023-07-08 PROCEDURE — 74177 CT ABD & PELVIS W/CONTRAST: CPT | Mod: 26,MA

## 2023-07-08 PROCEDURE — 99285 EMERGENCY DEPT VISIT HI MDM: CPT

## 2023-07-08 RX ORDER — PIPERACILLIN AND TAZOBACTAM 4; .5 G/20ML; G/20ML
3.38 INJECTION, POWDER, LYOPHILIZED, FOR SOLUTION INTRAVENOUS ONCE
Refills: 0 | Status: COMPLETED | OUTPATIENT
Start: 2023-07-08 | End: 2023-07-08

## 2023-07-08 RX ORDER — SODIUM CHLORIDE 9 MG/ML
1000 INJECTION INTRAMUSCULAR; INTRAVENOUS; SUBCUTANEOUS ONCE
Refills: 0 | Status: COMPLETED | OUTPATIENT
Start: 2023-07-08 | End: 2023-07-08

## 2023-07-08 RX ORDER — ONDANSETRON 8 MG/1
4 TABLET, FILM COATED ORAL ONCE
Refills: 0 | Status: COMPLETED | OUTPATIENT
Start: 2023-07-08 | End: 2023-07-08

## 2023-07-08 RX ORDER — ACETAMINOPHEN 500 MG
650 TABLET ORAL ONCE
Refills: 0 | Status: DISCONTINUED | OUTPATIENT
Start: 2023-07-08 | End: 2023-07-08

## 2023-07-08 RX ORDER — IOHEXOL 300 MG/ML
30 INJECTION, SOLUTION INTRAVENOUS ONCE
Refills: 0 | Status: COMPLETED | OUTPATIENT
Start: 2023-07-08 | End: 2023-07-08

## 2023-07-08 RX ORDER — ACETAMINOPHEN 500 MG
650 TABLET ORAL ONCE
Refills: 0 | Status: COMPLETED | OUTPATIENT
Start: 2023-07-08 | End: 2023-07-08

## 2023-07-08 RX ADMIN — PIPERACILLIN AND TAZOBACTAM 200 GRAM(S): 4; .5 INJECTION, POWDER, LYOPHILIZED, FOR SOLUTION INTRAVENOUS at 21:04

## 2023-07-08 RX ADMIN — IOHEXOL 30 MILLILITER(S): 300 INJECTION, SOLUTION INTRAVENOUS at 16:08

## 2023-07-08 RX ADMIN — Medication 650 MILLIGRAM(S): at 16:08

## 2023-07-08 RX ADMIN — SODIUM CHLORIDE 1000 MILLILITER(S): 9 INJECTION INTRAMUSCULAR; INTRAVENOUS; SUBCUTANEOUS at 16:09

## 2023-07-08 RX ADMIN — ONDANSETRON 4 MILLIGRAM(S): 8 TABLET, FILM COATED ORAL at 16:08

## 2023-07-08 NOTE — ED PROVIDER NOTE - CLINICAL SUMMARY MEDICAL DECISION MAKING FREE TEXT BOX
47-year-old female with past medical history of uterine fibroids status/post transvaginal procedure X 1, denies any other abdominal surgeries, anemia on iron, denies any other medical history or chronic medications, presents today with abdominal pain/cramping > left side and constipation x2 weeks.  Patient states that approx 2 weeks ago  she started having cramping abd pain > left side associated with menstruation with some nb diarrhea, nausea and nbnb emesis. Pt is not currently menstruating. Since then she has not been able to have a bowel movement for 2 weeks and has noted some mucus from her rectal area, no blood. No N/V in the past several days.  No fevers, chills, urinary sxs, gross hematuria, flank pain, chest pain, shortness of breath, cough, URI symptoms. No other complaints.  Contrary to triage patient has not taken any OTC medications for her constipation. Is vaccinated for Covid 19. 47-year-old female with past medical history of uterine fibroids status/post transvaginal procedure X 1, denies any other abdominal surgeries, anemia on iron, denies any other medical history or chronic medications, presents today with abdominal pain/cramping > left side and constipation x2 weeks.  Patient states that approx 2 weeks ago  she started having cramping abd pain > left side associated with menstruation with some nb diarrhea, nausea and nbnb emesis. Pt is not currently menstruating. Since then she has not been able to have a bowel movement for 2 weeks and has noted some mucus from her rectal area, no blood. No N/V in the past several days.  No fevers, chills, urinary sxs, gross hematuria, flank pain, chest pain, shortness of breath, cough, URI symptoms. No other complaints. Contrary to triage, patient has not taken any OTC medications for her constipation. Is vaccinated for Covid 19.    ED course: VS noted, Pt is febrile and tachycardic with temp of 102  On exam, soft, moderately distended, moderate LLQ ttp w/ voluntary guarding. Labs show 19.61, H/H 8.5/31.6, platelets 566, chemistry unremarkable, LFTs wnl. CTAP w/ PO and IV contrast demons 47-year-old female with past medical history of uterine fibroids status/post transvaginal procedure X 1, denies any other abdominal surgeries, anemia on iron, denies any other medical history or chronic medications, presents today with abdominal pain/cramping > left side and constipation x2 weeks.  Patient states that approx 2 weeks ago  she started having cramping abd pain > left side associated with menstruation with some nb diarrhea, nausea and nbnb emesis. Pt is not currently menstruating. Since then she has not been able to have a bowel movement for 2 weeks and has noted some mucus from her rectal area, no blood. No N/V in the past several days.  No fevers, chills, urinary sxs, gross hematuria, flank pain, chest pain, shortness of breath, cough, URI symptoms. No other complaints. Contrary to triage, patient has not taken any OTC medications for her constipation. Is vaccinated for Covid 19.    ED course: VS noted, Pt is febrile and tachycardic with temp of 101. On exam, abd is soft, moderately distended, moderate LLQ ttp w/ voluntary guarding. Labs show 19.61, H/H 8.5/31.6, platelets 566, chemistry unremarkable, LFTs wnl. CTAP pending. Case endorsed to night team pending CT A/P. 47-year-old female with past medical history of uterine fibroids status/post transvaginal procedure X 1, denies any other abdominal surgeries, anemia on iron, denies any other medical history or chronic medications, presents today with abdominal pain/cramping > left side and constipation x2 weeks.  Patient states that approx 2 weeks ago  she started having cramping abd pain > left side associated with menstruation with some nb diarrhea, nausea and nbnb emesis. Pt is not currently menstruating. Since then she has not been able to have a bowel movement for 2 weeks and has noted some mucus from her rectal area, no blood. No N/V in the past several days.  No fevers, chills, urinary sxs, gross hematuria, flank pain, chest pain, shortness of breath, cough, URI symptoms. No other complaints. Contrary to triage, patient has not taken any OTC medications for her constipation. Is vaccinated for Covid 19.    ED course: VS noted, Pt is febrile and tachycardic (117) with temp of 101.9. Given IVF, Tylenol, Morphine and Zofran in ED. On exam, abdomen is soft, moderately distended with moderate LLQ ttp w/ voluntary guarding. Labs show WBC 19.61, H/H 8.5/31.6, platelets 566, chemistry unremarkable, LFTs wnl. CTA/P pending. Case endorsed to night team pending CT A/P and reevaluation. Findings discussed with patient.

## 2023-07-08 NOTE — ED ADULT NURSE NOTE - NSFALLUNIVINTERV_ED_ALL_ED
Bed/Stretcher in lowest position, wheels locked, appropriate side rails in place/Call bell, personal items and telephone in reach/Instruct patient to call for assistance before getting out of bed/chair/stretcher/Non-slip footwear applied when patient is off stretcher/[place] to call system/Physically safe environment - no spills, clutter or unnecessary equipment/Purposeful proactive rounding/Room/bathroom lighting operational, light cord in reach

## 2023-07-08 NOTE — ED ADULT NURSE NOTE - TEMPLATE LIST FOR HEAD TO TOE ASSESSMENT
CHF Week 3 Survey    Flowsheet Row Responses   [facility] [employer] facility patient discharged from? [facility]   Does the patient have one of the following disease processes/diagnoses(primary or secondary)? CHF   Week 3 attempt successful? No   Unsuccessful attempts Attempt 1          KIRBY ADAMS - Licensed Nurse   Abdominal Pain, N/V/D

## 2023-07-08 NOTE — ED PROVIDER NOTE - PROGRESS NOTE DETAILS
laine / [facility]is  received on sign out. pt w LLQ pain. febrile rectally, +tachy, +leukocytosis.   [facility] sign out [facility] "IMPRESSION:  Findings most consistent with acute diverticulitis with associated   abscess.  Complex cystic structure in the right adnexa could represent a   hemorrhagic cyst. Recommend follow-up and correlation with pelvic   ultrasound.  Significantly enlarged fibroid uterus."    CT as above. given ceft / flagyl. surgery consulted. to be admitted to surg service  pain controlled, vitals stable and abx exam benign at time of admission

## 2023-07-08 NOTE — ED PROVIDER NOTE - CONSTITUTIONAL, MLM
normal... Well appearing, awake, alert, oriented and in no apparent distress. Anxious, tearful and crying Well appearing, awake, alert and in no apparent distress. Anxious, tearful and crying.

## 2023-07-08 NOTE — ED ADULT TRIAGE NOTE - CHIEF COMPLAINT QUOTE
Pt presents to ED C/O LLQ abd pain with bloating x 2 weeks. Pt states, " I haven't had a bowel movement in two weeks and I'm noticing mucous". Pt reports trying OTC meds with no relief.

## 2023-07-08 NOTE — ED ADULT NURSE NOTE - OBJECTIVE STATEMENT
Pt presented to ED with c/o of abdominal pain/cramping > left side and constipation x2 weeks. AOX4. VSS.  Patient denies chest pain, discomfort, shortness of breath, difficulty breathing and any form of distress not noted. Patient oriented to ED area. All needs attended. Rounding in progress. Fall risk precautions maintained.

## 2023-07-08 NOTE — ED PROVIDER NOTE - OBJECTIVE STATEMENT
47-year-old female with past medical history of uterine fibroids status/post transvaginal procedure X 1, denies any other abdominal surgeries, anemia on iron, denies any other medical history or chronic medications, presents today with abdominal pain/cramping > left side and constipation x2 weeks.  Patient states that approx 2 weeks ago  she started having cramping abd pain > left side associated with menstruation with some nb diarrhea, nausea and nbnb emesis. Pt is not currently menstruating. Since then she has not been able to have a bowel movement for 2 weeks and has noted some mucus from her rectal area, no blood. No N/V in the past several days.  No fevers, chills, urinary sxs, gross hematuria, flank pain, chest pain, shortness of breath, cough, URI symptoms. No other complaints.  Contrary to triage patient has not taken any OTC medications for her constipation. Is vaccinated for Covid 19. 47-year-old female with past medical history of uterine fibroids status/post transvaginal procedure X 1, denies any other abdominal surgeries, anemia on iron, denies any other medical history or chronic medications, presents today with abdominal pain/cramping > left side and constipation x2 weeks.  Patient states that approx 2 weeks ago  she started having cramping abd pain > left side associated with menstruation with some nb diarrhea, nausea and nbnb emesis. Pt is not currently menstruating. Since then she has not been able to have a bowel movement for 2 weeks and has noted some mucus from her rectal area, no blood. No N/V in the past several days.  No fevers, chills, urinary sxs, gross hematuria, flank pain, chest pain, shortness of breath, cough, URI symptoms. No other complaints.  Contrary to triage, patient has not taken any OTC medications for her constipation. Is vaccinated for Covid 19.

## 2023-07-09 ENCOUNTER — INPATIENT (INPATIENT)
Facility: HOSPITAL | Age: 48
LOS: 1 days | Discharge: HOME | DRG: 871 | End: 2023-07-11
Attending: SURGERY | Admitting: SURGERY
Payer: COMMERCIAL

## 2023-07-09 LAB
ANION GAP SERPL CALC-SCNC: 12 MMOL/L — SIGNIFICANT CHANGE UP (ref 5–17)
BLD GP AB SCN SERPL QL: NEGATIVE — SIGNIFICANT CHANGE UP
BUN SERPL-MCNC: 3 MG/DL — LOW (ref 7–23)
CALCIUM SERPL-MCNC: 8.3 MG/DL — LOW (ref 8.4–10.5)
CHLORIDE SERPL-SCNC: 103 MMOL/L — SIGNIFICANT CHANGE UP (ref 96–108)
CO2 SERPL-SCNC: 25 MMOL/L — SIGNIFICANT CHANGE UP (ref 22–31)
CREAT SERPL-MCNC: 0.65 MG/DL — SIGNIFICANT CHANGE UP (ref 0.5–1.3)
CULTURE RESULTS: SIGNIFICANT CHANGE UP
EGFR: 110 ML/MIN/1.73M2 — SIGNIFICANT CHANGE UP
GLUCOSE SERPL-MCNC: 100 MG/DL — HIGH (ref 70–99)
GRAM STN FLD: SIGNIFICANT CHANGE UP
HCT VFR BLD CALC: 25.5 % — LOW (ref 34.5–45)
HCT VFR BLD CALC: 27.2 % — LOW (ref 34.5–45)
HGB BLD-MCNC: 6.9 G/DL — CRITICAL LOW (ref 11.5–15.5)
HGB BLD-MCNC: 7.3 G/DL — LOW (ref 11.5–15.5)
MAGNESIUM SERPL-MCNC: 1.7 MG/DL — SIGNIFICANT CHANGE UP (ref 1.6–2.6)
MCHC RBC-ENTMCNC: 19.2 PG — LOW (ref 27–34)
MCHC RBC-ENTMCNC: 19.2 PG — LOW (ref 27–34)
MCHC RBC-ENTMCNC: 26.8 GM/DL — LOW (ref 32–36)
MCHC RBC-ENTMCNC: 27.1 GM/DL — LOW (ref 32–36)
MCV RBC AUTO: 70.8 FL — LOW (ref 80–100)
MCV RBC AUTO: 71.4 FL — LOW (ref 80–100)
NRBC # BLD: 0 /100 WBCS — SIGNIFICANT CHANGE UP (ref 0–0)
NRBC # BLD: 0 /100 WBCS — SIGNIFICANT CHANGE UP (ref 0–0)
PHOSPHATE SERPL-MCNC: 3.4 MG/DL — SIGNIFICANT CHANGE UP (ref 2.5–4.5)
PLATELET # BLD AUTO: 472 K/UL — HIGH (ref 150–400)
PLATELET # BLD AUTO: 487 K/UL — HIGH (ref 150–400)
POTASSIUM SERPL-MCNC: 3.2 MMOL/L — LOW (ref 3.5–5.3)
POTASSIUM SERPL-SCNC: 3.2 MMOL/L — LOW (ref 3.5–5.3)
RBC # BLD: 3.6 M/UL — LOW (ref 3.8–5.2)
RBC # BLD: 3.81 M/UL — SIGNIFICANT CHANGE UP (ref 3.8–5.2)
RBC # FLD: 19.3 % — HIGH (ref 10.3–14.5)
RBC # FLD: 19.4 % — HIGH (ref 10.3–14.5)
RH IG SCN BLD-IMP: POSITIVE — SIGNIFICANT CHANGE UP
SODIUM SERPL-SCNC: 140 MMOL/L — SIGNIFICANT CHANGE UP (ref 135–145)
SPECIMEN SOURCE: SIGNIFICANT CHANGE UP
SPECIMEN SOURCE: SIGNIFICANT CHANGE UP
WBC # BLD: 14.73 K/UL — HIGH (ref 3.8–10.5)
WBC # BLD: 17.36 K/UL — HIGH (ref 3.8–10.5)
WBC # FLD AUTO: 14.73 K/UL — HIGH (ref 3.8–10.5)
WBC # FLD AUTO: 17.36 K/UL — HIGH (ref 3.8–10.5)

## 2023-07-09 PROCEDURE — 99232 SBSQ HOSP IP/OBS MODERATE 35: CPT

## 2023-07-09 PROCEDURE — 49406 IMAGE CATH FLUID PERI/RETRO: CPT

## 2023-07-09 PROCEDURE — 76856 US EXAM PELVIC COMPLETE: CPT | Mod: 26

## 2023-07-09 RX ORDER — ACETAMINOPHEN 500 MG
650 TABLET ORAL EVERY 6 HOURS
Refills: 0 | Status: DISCONTINUED | OUTPATIENT
Start: 2023-07-09 | End: 2023-07-11

## 2023-07-09 RX ORDER — METRONIDAZOLE 500 MG
500 TABLET ORAL EVERY 8 HOURS
Refills: 0 | Status: DISCONTINUED | OUTPATIENT
Start: 2023-07-09 | End: 2023-07-11

## 2023-07-09 RX ORDER — ACETAMINOPHEN 500 MG
1000 TABLET ORAL ONCE
Refills: 0 | Status: COMPLETED | OUTPATIENT
Start: 2023-07-09 | End: 2023-07-09

## 2023-07-09 RX ORDER — POTASSIUM CHLORIDE 20 MEQ
40 PACKET (EA) ORAL
Refills: 0 | Status: DISCONTINUED | OUTPATIENT
Start: 2023-07-09 | End: 2023-07-09

## 2023-07-09 RX ORDER — ACETAMINOPHEN 500 MG
1000 TABLET ORAL ONCE
Refills: 0 | Status: COMPLETED | OUTPATIENT
Start: 2023-07-10 | End: 2023-07-10

## 2023-07-09 RX ORDER — CEFTRIAXONE 500 MG/1
1000 INJECTION, POWDER, FOR SOLUTION INTRAMUSCULAR; INTRAVENOUS EVERY 24 HOURS
Refills: 0 | Status: DISCONTINUED | OUTPATIENT
Start: 2023-07-09 | End: 2023-07-11

## 2023-07-09 RX ORDER — ONDANSETRON 8 MG/1
4 TABLET, FILM COATED ORAL EVERY 6 HOURS
Refills: 0 | Status: DISCONTINUED | OUTPATIENT
Start: 2023-07-09 | End: 2023-07-11

## 2023-07-09 RX ORDER — POTASSIUM CHLORIDE 20 MEQ
10 PACKET (EA) ORAL
Refills: 0 | Status: DISCONTINUED | OUTPATIENT
Start: 2023-07-09 | End: 2023-07-09

## 2023-07-09 RX ORDER — SODIUM CHLORIDE 9 MG/ML
1000 INJECTION, SOLUTION INTRAVENOUS
Refills: 0 | Status: DISCONTINUED | OUTPATIENT
Start: 2023-07-09 | End: 2023-07-11

## 2023-07-09 RX ORDER — MAGNESIUM SULFATE 500 MG/ML
1 VIAL (ML) INJECTION ONCE
Refills: 0 | Status: COMPLETED | OUTPATIENT
Start: 2023-07-09 | End: 2023-07-09

## 2023-07-09 RX ORDER — POTASSIUM CHLORIDE 20 MEQ
40 PACKET (EA) ORAL ONCE
Refills: 0 | Status: COMPLETED | OUTPATIENT
Start: 2023-07-09 | End: 2023-07-09

## 2023-07-09 RX ORDER — LIDOCAINE 4 G/100G
1 CREAM TOPICAL ONCE
Refills: 0 | Status: COMPLETED | OUTPATIENT
Start: 2023-07-09 | End: 2023-07-09

## 2023-07-09 RX ADMIN — SODIUM CHLORIDE 140 MILLILITER(S): 9 INJECTION, SOLUTION INTRAVENOUS at 02:42

## 2023-07-09 RX ADMIN — Medication 100 MILLIGRAM(S): at 05:40

## 2023-07-09 RX ADMIN — Medication 1000 MILLIGRAM(S): at 04:50

## 2023-07-09 RX ADMIN — Medication 40 MILLIEQUIVALENT(S): at 21:11

## 2023-07-09 RX ADMIN — Medication 400 MILLIGRAM(S): at 18:09

## 2023-07-09 RX ADMIN — CEFTRIAXONE 100 MILLIGRAM(S): 500 INJECTION, POWDER, FOR SOLUTION INTRAMUSCULAR; INTRAVENOUS at 02:42

## 2023-07-09 RX ADMIN — Medication 100 GRAM(S): at 11:36

## 2023-07-09 RX ADMIN — Medication 1000 MILLIGRAM(S): at 18:39

## 2023-07-09 RX ADMIN — LIDOCAINE 1 PATCH: 4 CREAM TOPICAL at 22:35

## 2023-07-09 RX ADMIN — Medication 100 MILLIGRAM(S): at 18:36

## 2023-07-09 RX ADMIN — Medication 400 MILLIGRAM(S): at 03:24

## 2023-07-09 RX ADMIN — ONDANSETRON 4 MILLIGRAM(S): 8 TABLET, FILM COATED ORAL at 08:57

## 2023-07-09 NOTE — H&P ADULT - NSHPPHYSICALEXAM_GEN_ALL_CORE
General: NAD, resting comfortably in bed.  C/V: NSR.  Pulm: Nonlabored breathing, no respiratory distress on RA.  Abd: soft, ND/ND. Incisions clean, dry, and intact.  Extrem: WWP, no edema, SCDs in place.    Vital Signs Last 24 Hrs  T(C): 37.2 (09 Jul 2023 01:06), Max: 38.8 (08 Jul 2023 16:05)  T(F): 98.9 (09 Jul 2023 01:06), Max: 101.9 (08 Jul 2023 16:05)  HR: 82 (09 Jul 2023 01:06) (82 - 117)  BP: 118/73 (09 Jul 2023 01:06) (110/72 - 148/79)  BP(mean): --  RR: 18 (09 Jul 2023 01:06) (16 - 18)  SpO2: 99% (09 Jul 2023 01:06) (97% - 100%)    Parameters below as of 08 Jul 2023 21:16  Patient On (Oxygen Delivery Method): room air

## 2023-07-09 NOTE — H&P ADULT - HISTORY OF PRESENT ILLNESS
47F PMHx uterine fibroids s/p TV excision, anemia, no PSHx p/w 2-week history of L sided abdominal pain and constipation. States her pain started 2 weeks ago, worse in LLQ, associated w/ nausea and NBNB emesis. Experienced loose BM at onset however has been constipated for 2 weeks. Denies fevers/chills, melena/hematochezia, dysuria, cp, sob.    Denies hx of EGD or colonoscopy.    Denies family history of colon cancer, Crohn's disease, UC, IBS.    In the ED, patient was initially tachycarcic to 117 and febrile to 101.9, resolved w/ 2L NS, hemodynamically stable. On exam, soft, moderately distended, moderate LLQ ttp w/ voluntary guarding. Labs show 19.61, H/H 8.5/31.6, platelets 566, chemistry unremarkable, LFTs wnl. CTAP w/ PO and IV contrast demonstrates significant pericolonic inflammatory fat stranding adjacent to descending and sigmoid colon in a region of diverticulosis; collection of fluid and gas measuring approximately 9.5x6.3cm consistent w/ perforated diverticulitis w/ abscess; complex cystic structure in R adenxa possibly representing hemorrhagic cyst.    47F PMHx uterine fibroids s/p TV excision, anemia, no PSHx p/w 2-week history of L sided abdominal pain and constipation. States her pain started 2 weeks ago, worse in LLQ, associated w/ nausea and NBNB emesis. Experienced loose BM at onset however has been constipated for 2 weeks. Denies fevers/chills, melena/hematochezia, dysuria, cp, sob.    Denies hx of EGD or colonoscopy.    Denies family history of colon cancer, Crohn's disease, UC, IBS.    In the ED, patient was initially tachycardic to 117 and febrile to 101.9, resolved w/ 2L NS, hemodynamically stable. On exam, soft, moderately distended, moderate LLQ ttp w/ voluntary guarding. Labs show 19.61, H/H 8.5/31.6, platelets 566, chemistry unremarkable, LFTs wnl. CTAP w/ PO and IV contrast demonstrates significant pericolonic inflammatory fat stranding adjacent to descending and sigmoid colon in a region of diverticulosis; collection of fluid and gas measuring approximately 9.5x6.3cm consistent w/ perforated diverticulitis w/ abscess; complex cystic structure in R adenxa possibly representing hemorrhagic cyst.

## 2023-07-09 NOTE — CONSULT NOTE ADULT - SUBJECTIVE AND OBJECTIVE BOX
47F with PMHx uterine fibroids s/p TV excision, anemia who presents with L sided abdominal pain and constipation. States her pain started 2 weeks ago, worse in LLQ, associated w/ nausea and NBNB emesis. Experienced loose BM at onset however has been constipated for 2 weeks. Denies hx of EGD or colonoscopy.    In the ED, patient was initially tachycardic to 117 and febrile to 101.9, resolved w/ 2L NS, hemodynamically stable. On exam, soft, moderately distended, moderate LLQ ttp w/ voluntary guarding. Labs show 19.61, H/H 8.5/31.6, platelets 566, chemistry unremarkable, LFTs wnl. CTAP w/ PO and IV contrast demonstrates significant pericolonic inflammatory fat stranding adjacent to descending and sigmoid colon in a region of diverticulosis; collection of fluid and gas measuring approximately 9.5x6.3cm consistent w/ perforated diverticulitis w/ abscess; complex cystic structure in R adenxa possibly representing hemorrhagic cyst.     SUBJECTIVE  Patient seen and examined at bedside. Reports abdominal discomfort that is slightly improving. Denies fevers, chills, chest pain, dyspnea, cough, nausea, vomiting, diarrhea or weakness.    MEDICATIONS  (STANDING):  cefTRIAXone   IVPB 1000 milliGRAM(s) IV Intermittent every 24 hours  lactated ringers. 1000 milliLiter(s) (140 mL/Hr) IV Continuous <Continuous>  metroNIDAZOLE  IVPB 500 milliGRAM(s) IV Intermittent every 8 hours  potassium chloride  10 mEq/100 mL IVPB 10 milliEquivalent(s) IV Intermittent every 1 hour    MEDICATIONS  (PRN):  ondansetron Injectable 4 milliGRAM(s) IV Push every 6 hours PRN Nausea and/or Vomiting    OBJECTIVE  Vital Signs Last 24 Hrs  T(C): 37 (09 Jul 2023 08:55), Max: 38.8 (08 Jul 2023 16:05)  T(F): 98.6 (09 Jul 2023 08:55), Max: 101.9 (08 Jul 2023 16:05)  HR: 93 (09 Jul 2023 08:55) (82 - 117)  BP: 123/80 (09 Jul 2023 08:55) (110/72 - 148/79)  BP(mean): 92 (09 Jul 2023 05:00) (92 - 92)  RR: 17 (09 Jul 2023 08:55) (16 - 18)  SpO2: 99% (09 Jul 2023 08:55) (92% - 100%)    Parameters below as of 09 Jul 2023 08:55  Patient On (Oxygen Delivery Method): room air    I&O's Summary    08 Jul 2023 07:01  -  09 Jul 2023 07:00  --------------------------------------------------------  IN: 700 mL / OUT: 0 mL / NET: 700 mL      LABS                        7.3    17.36 )-----------( 487      ( 09 Jul 2023 08:37 )             27.2   07-09    140  |  103  |  3<L>  ----------------------------<  100<H>  3.2<L>   |  25  |  0.65    Ca    8.3<L>      09 Jul 2023 05:30  Phos  3.4     07-09  Mg     1.7     07-09    TPro  8.5<H>  /  Alb  3.4  /  TBili  0.3  /  DBili  x   /  AST  22  /  ALT  21  /  AlkPhos  95  07-08    Urinalysis Basic - ( 09 Jul 2023 05:30 )    Color: x / Appearance: x / SG: x / pH: x  Gluc: 100 mg/dL / Ketone: x  / Bili: x / Urobili: x   Blood: x / Protein: x / Nitrite: x   Leuk Esterase: x / RBC: x / WBC x   Sq Epi: x / Non Sq Epi: x / Bacteria: x      RADIOLOGY  CT A/P with cont: Findings most consistent with acute diverticulitis with associated   abscess.  Complex cystic structure in the right adnexa could represent a   hemorrhagic cyst. Recommend follow-up and correlation with pelvic   ultrasound.  Significantly enlarged fibroid uterus.

## 2023-07-09 NOTE — PATIENT PROFILE ADULT - FALL HARM RISK - UNIVERSAL INTERVENTIONS
Bed in lowest position, wheels locked, appropriate side rails in place/Call bell, personal items and telephone in reach/Instruct patient to call for assistance before getting out of bed or chair/Non-slip footwear when patient is out of bed/[place] to call system/Physically safe environment - no spills, clutter or unnecessary equipment/Purposeful Proactive Rounding/Room/bathroom lighting operational, light cord in reach

## 2023-07-09 NOTE — PATIENT PROFILE ADULT - NSPROGENBLOODRESTRICT_GEN_A_NUR
Pt states she has received transfusion 3x during previous admissions and is concerned if she will react this time, although she has not had a reaction prior. Pt would like to discuss with MD before any transfusions if needed.

## 2023-07-09 NOTE — H&P ADULT - ASSESSMENT
47F PMHx uterine fibroids s/p excision, anemia, no PSHx p/w 2-week history of L sided abdominal pain. initially tachycarcic to 117 and febrile to 101.9, resolved w/ 2L NS, hemodynamically stable. On exam, Labs show 19.61, H/H 8.5/31.6, platelets 566, chemistry unremarkable, LFTs wnl. CTAP w/ PO and IV contrast demonstrates significant pericolonic inflammatory fat stranding adjacent to descending and sigmoid colon in a region of diverticulosis; collection of fluid and gas measuring approximately 9.5x6.3cm consistent w/ perforated diverticulitis w/ abscess, Hinchey class II.    Admit to general surgery, team 4, Dr. Salinas, regional  NPO/IVF  CTX/Flagyl  Pelvic US r/o hemorrhagic cyst  IR consult in AM  HSQ/SCDs/OOBA/IS  AM labs   47F PMHx uterine fibroids s/p excision, anemia, no PSHx p/w 2-week history of L sided abdominal pain. initially tachycarcic to 117 and febrile to 101.9, resolved w/ 2L NS, hemodynamically stable. On exam, Labs show 19.61, H/H 8.5/31.6, platelets 566, chemistry unremarkable, LFTs wnl. CTAP w/ PO and IV contrast demonstrates significant pericolonic inflammatory fat stranding adjacent to descending and sigmoid colon in a region of diverticulosis; collection of fluid and gas measuring approximately 9.5x6.3cm consistent w/ perforated diverticulitis w/ abscess, Hinchey class II.    Admit to general surgery, team 4, Dr. Salinas, regional  NPO/IVF  CTX/Flagyl  Pelvic US r/o hemorrhagic cyst  IR consult in AM  Holding chem DVT ppx in setting of possible IR procedure  SCDs/OOBA/IS  AM labs   47F PMHx uterine fibroids s/p excision, anemia, no PSHx p/w 2-week history of L sided abdominal pain. initially tachycardic to 117 and febrile to 101.9, resolved w/ 2L NS, hemodynamically stable. On exam, Labs show 19.61, H/H 8.5/31.6, platelets 566, chemistry unremarkable, LFTs wnl. CTAP w/ PO and IV contrast demonstrates significant pericolonic inflammatory fat stranding adjacent to descending and sigmoid colon in a region of diverticulosis; collection of fluid and gas measuring approximately 9.5x6.3cm consistent w/ perforated diverticulitis w/ abscess, Hinchey class II.    Admit to general surgery, team 4, Dr. Salinas, regional  NPO/IVF  CTX/Flagyl  Pelvic US r/o hemorrhagic cyst  IR consult in AM  Holding chem DVT ppx in setting of possible IR procedure  SCDs/OOBA/IS  AM labs

## 2023-07-09 NOTE — H&P ADULT - NSHPLABSRESULTS_GEN_ALL_CORE
8.5    19.61 )-----------( 566      ( 08 Jul 2023 15:24 )             31.6   07-08    143  |  102  |  4<L>  ----------------------------<  113<H>  3.3<L>   |  25  |  0.66    Ca    9.6      08 Jul 2023 15:24    TPro  8.5<H>  /  Alb  3.4  /  TBili  0.3  /  DBili  x   /  AST  22  /  ALT  21  /  AlkPhos  95  07-08    FINDINGS:    Study is degraded by suboptimal IV contrast opacification.    LOWER CHEST: Bibasilar atelectasis.    LIVER: Within normal limits.  BILE DUCTS: Normal caliber.  GALLBLADDER: Within normal limits.  SPLEEN: Splenomegaly.  PANCREAS: Within normal limits.  ADRENALS: Within normal limits.  KIDNEYS/URETERS: Within normal limits.    BLADDER: Within normal limits.  REPRODUCTIVE ORGANS: Significantly enlarged fibroid uterus. There is a   complex cystic structure in the right adnexa measuring 6.0 cm.    BOWEL: No bowel obstruction. Colonic diverticulosis. There is significant   pericolonic inflammatory fat stranding adjacent to the descending and   sigmoid colon in a region of diverticulosis. There is a collection of   fluid and gas in this region measuring approximately 9.5 x 6.3 cm. No   free air. Appendix is normal.  PERITONEUM: Trace pelvic ascites.  VESSELS: Within normal limits.  RETROPERITONEUM/LYMPH NODES: No lymphadenopathy.  ABDOMINAL WALL: Small fat-containing umbilical hernia.  BONES: Moderate spinal levoscoliosis. Degenerative changes most notably   at L5/S1.    IMPRESSION:  Findings most consistent with acute diverticulitis with associated   abscess.    Complex cystic structure in the right adnexa could represent a   hemorrhagic cyst. Recommend follow-up and correlation with pelvic   ultrasound.    Significantly enlarged fibroid uterus.

## 2023-07-09 NOTE — CONSULT NOTE ADULT - ASSESSMENT
47F with fibroids and anemia presenting with abdominal pain and was fount to have perforated diverticulitis with abscess formation. Febrile to 101.9 and tachycardic on presentation. Labs reveal leukocytosis, anemia and hypokalemia. Imaging of the abdomen shows acute diverticulitis with associated abscess, and a complex cystic structure in the right adnexa with an enlarged fibroid uterus. Patient's presentation along with her labs and imaging are consistent with sepsis secondary to perforated diverticulitis with abscess formation.    #Sepsis secondary to perforated diverticulitis with abscess  - Plan for IR drainage today  - Follow up post-drainage cultures  - Continue with ceftriaxone and flagyl  - Follow up septic workup (BCx x2, UA, CXR)  - Rest of management as per surgical team    #Right adnexal complex cyst, r/u hemorrhagic cyst  - Obtain TVUS  - Supportive care    #Uterine fibroids  - Monitor for vaginal bleeding  - Supportive care    #Anemia  - Hgb dropped from 8.5 to 7.3 (as per patient, her baseline is ~9)  - Hold AC for now  - DVT ppx with SCDs

## 2023-07-10 ENCOUNTER — TRANSCRIPTION ENCOUNTER (OUTPATIENT)
Age: 48
End: 2023-07-10

## 2023-07-10 LAB
ANION GAP SERPL CALC-SCNC: 14 MMOL/L — SIGNIFICANT CHANGE UP (ref 5–17)
BUN SERPL-MCNC: 5 MG/DL — LOW (ref 7–23)
CALCIUM SERPL-MCNC: 8.6 MG/DL — SIGNIFICANT CHANGE UP (ref 8.4–10.5)
CHLORIDE SERPL-SCNC: 106 MMOL/L — SIGNIFICANT CHANGE UP (ref 96–108)
CO2 SERPL-SCNC: 23 MMOL/L — SIGNIFICANT CHANGE UP (ref 22–31)
CREAT SERPL-MCNC: 0.58 MG/DL — SIGNIFICANT CHANGE UP (ref 0.5–1.3)
EGFR: 113 ML/MIN/1.73M2 — SIGNIFICANT CHANGE UP
GLUCOSE SERPL-MCNC: 78 MG/DL — SIGNIFICANT CHANGE UP (ref 70–99)
HCT VFR BLD CALC: 26.8 % — LOW (ref 34.5–45)
HGB BLD-MCNC: 6.9 G/DL — CRITICAL LOW (ref 11.5–15.5)
MAGNESIUM SERPL-MCNC: 1.9 MG/DL — SIGNIFICANT CHANGE UP (ref 1.6–2.6)
MCHC RBC-ENTMCNC: 18.7 PG — LOW (ref 27–34)
MCHC RBC-ENTMCNC: 25.7 GM/DL — LOW (ref 32–36)
MCV RBC AUTO: 72.6 FL — LOW (ref 80–100)
NRBC # BLD: 0 /100 WBCS — SIGNIFICANT CHANGE UP (ref 0–0)
PHOSPHATE SERPL-MCNC: 3.3 MG/DL — SIGNIFICANT CHANGE UP (ref 2.5–4.5)
PLATELET # BLD AUTO: 546 K/UL — HIGH (ref 150–400)
POTASSIUM SERPL-MCNC: 3.6 MMOL/L — SIGNIFICANT CHANGE UP (ref 3.5–5.3)
POTASSIUM SERPL-SCNC: 3.6 MMOL/L — SIGNIFICANT CHANGE UP (ref 3.5–5.3)
RBC # BLD: 3.69 M/UL — LOW (ref 3.8–5.2)
RBC # FLD: 19.2 % — HIGH (ref 10.3–14.5)
SODIUM SERPL-SCNC: 143 MMOL/L — SIGNIFICANT CHANGE UP (ref 135–145)
WBC # BLD: 9.83 K/UL — SIGNIFICANT CHANGE UP (ref 3.8–10.5)
WBC # FLD AUTO: 9.83 K/UL — SIGNIFICANT CHANGE UP (ref 3.8–10.5)

## 2023-07-10 PROCEDURE — 99233 SBSQ HOSP IP/OBS HIGH 50: CPT

## 2023-07-10 RX ORDER — HEPARIN SODIUM 5000 [USP'U]/ML
5000 INJECTION INTRAVENOUS; SUBCUTANEOUS EVERY 8 HOURS
Refills: 0 | Status: DISCONTINUED | OUTPATIENT
Start: 2023-07-10 | End: 2023-07-10

## 2023-07-10 RX ORDER — ACETAMINOPHEN 500 MG
1000 TABLET ORAL ONCE
Refills: 0 | Status: COMPLETED | OUTPATIENT
Start: 2023-07-10 | End: 2023-07-10

## 2023-07-10 RX ORDER — FERROUS SULFATE 325(65) MG
325 TABLET ORAL DAILY
Refills: 0 | Status: DISCONTINUED | OUTPATIENT
Start: 2023-07-10 | End: 2023-07-11

## 2023-07-10 RX ORDER — POTASSIUM CHLORIDE 20 MEQ
20 PACKET (EA) ORAL ONCE
Refills: 0 | Status: COMPLETED | OUTPATIENT
Start: 2023-07-10 | End: 2023-07-10

## 2023-07-10 RX ORDER — MAGNESIUM SULFATE 500 MG/ML
1 VIAL (ML) INJECTION ONCE
Refills: 0 | Status: COMPLETED | OUTPATIENT
Start: 2023-07-10 | End: 2023-07-10

## 2023-07-10 RX ADMIN — LIDOCAINE 1 PATCH: 4 CREAM TOPICAL at 07:08

## 2023-07-10 RX ADMIN — Medication 100 MILLIGRAM(S): at 01:04

## 2023-07-10 RX ADMIN — Medication 400 MILLIGRAM(S): at 23:51

## 2023-07-10 RX ADMIN — Medication 100 MILLIGRAM(S): at 09:32

## 2023-07-10 RX ADMIN — Medication 400 MILLIGRAM(S): at 00:04

## 2023-07-10 RX ADMIN — Medication 325 MILLIGRAM(S): at 12:07

## 2023-07-10 RX ADMIN — Medication 100 GRAM(S): at 10:39

## 2023-07-10 RX ADMIN — Medication 20 MILLIEQUIVALENT(S): at 08:32

## 2023-07-10 RX ADMIN — LIDOCAINE 1 PATCH: 4 CREAM TOPICAL at 10:43

## 2023-07-10 RX ADMIN — Medication 100 MILLIGRAM(S): at 17:38

## 2023-07-10 RX ADMIN — Medication 400 MILLIGRAM(S): at 06:05

## 2023-07-10 RX ADMIN — CEFTRIAXONE 100 MILLIGRAM(S): 500 INJECTION, POWDER, FOR SOLUTION INTRAMUSCULAR; INTRAVENOUS at 02:50

## 2023-07-10 NOTE — DISCHARGE NOTE PROVIDER - NSDCFUADDINST_GEN_ALL_CORE_FT
General Discharge Instructions:  Please resume all regular home medications unless specifically advised not to take a particular medication. Also, please take any new medications as prescribed.  Please get plenty of rest, continue to ambulate several times per day, and drink adequate amounts of fluids.   Please follow-up with your surgeon and Primary Care Provider (PCP) in 1-2 weeks.    Warning Signs:  Please call your doctor if you experience the following:  *You experience new chest pain, pressure, squeezing or tightness.  *New or worsening cough, shortness of breath, or wheeze.  *If you are vomiting and cannot keep down fluids or your medications.  *You are getting dehydrated due to continued vomiting, diarrhea, or other reasons. Signs of dehydration include dry mouth, rapid heartbeat, or feeling dizzy or faint when standing.  *You see blood or dark/black material when you vomit or have a bowel movement.  *You experience burning when you urinate, have blood in your urine, or experience a discharge.  *Your pain is not improving within 8-12 hours or is not gone within 24 hours. Call or return immediately if your pain is getting worse, changes location, or moves to your chest or back.  *You have shaking chills, or fever greater than 101.5 degrees Fahrenheit or 38 degrees Celsius.  *Any change in your symptoms, or any new symptoms that concern you.

## 2023-07-10 NOTE — PROGRESS NOTE ADULT - SUBJECTIVE AND OBJECTIVE BOX
Patient is a 47y old  Female who presents with a chief complaint of perforated diverticulitis w/ abscess (10 Jul 2023 10:41)    INTERVAL EVENTS:    SUBJECTIVE:  Patient was seen and examined at bedside. Reports abdominal pain improved, no N/V, passing flatus. Denies chest pain, no palpitations, no dizziness, no SOB. Refusing blood transfusion, she reports last known Hb is from 2020, she hasn't followed with a provider since then. She reports menorrhagia improved since uterine artery embolization, denies any source of bleeding besides periods. No other complaints or events reported.    Review of systems: No fever, chills, dizziness, HA, Changes in vision, CP, dyspnea, nausea or vomiting, dysuria, changes in bowel movements, LE edema. Rest of 12 point Review of systems negative unless otherwise documented elsewhere in note.     Diet, Clear Liquid (07-10-23 @ 07:08) [Active]      MEDICATIONS:  MEDICATIONS  (STANDING):  cefTRIAXone   IVPB 1000 milliGRAM(s) IV Intermittent every 24 hours  ferrous    sulfate 325 milliGRAM(s) Oral daily  lactated ringers. 1000 milliLiter(s) (75 mL/Hr) IV Continuous <Continuous>  metroNIDAZOLE  IVPB 500 milliGRAM(s) IV Intermittent every 8 hours    MEDICATIONS  (PRN):  acetaminophen     Tablet .. 650 milliGRAM(s) Oral every 6 hours PRN Mild Pain (1 - 3), Moderate Pain (4 - 6)  ondansetron Injectable 4 milliGRAM(s) IV Push every 6 hours PRN Nausea and/or Vomiting      Allergies    aspirin (Unknown)    Intolerances        OBJECTIVE:  Vital Signs Last 24 Hrs  T(C): 36.8 (10 Jul 2023 08:30), Max: 36.8 (09 Jul 2023 20:43)  T(F): 98.2 (10 Jul 2023 08:30), Max: 98.2 (09 Jul 2023 20:43)  HR: 75 (10 Jul 2023 08:30) (75 - 84)  BP: 128/83 (10 Jul 2023 08:30) (123/77 - 133/76)  BP(mean): --  RR: 17 (10 Jul 2023 08:30) (17 - 17)  SpO2: 98% (10 Jul 2023 08:30) (95% - 98%)    Parameters below as of 10 Jul 2023 08:30  Patient On (Oxygen Delivery Method): room air      I&O's Summary    09 Jul 2023 07:01  -  10 Jul 2023 07:00  --------------------------------------------------------  IN: 2440 mL / OUT: 475 mL / NET: 1965 mL        PHYSICAL EXAM:  Gen: Reclining in bed at time of exam, appears stated age  HEENT: NCAT, MMM, clear OP  Neck: supple, trachea at midline  CV: RRR, +S1/S2  Pulm: adequate respiratory effort, no increase in work of breathing  Abd: soft, NTND  Skin: warm and dry,   Ext: WWP, no LE edema  Neuro: AOx3, speaking in full sentences  Psych: affect and behavior appropriate, pleasant at time of interview    LABS:                        6.9    9.83  )-----------( 546      ( 10 Jul 2023 05:30 )             26.8     07-10    143  |  106  |  5<L>  ----------------------------<  78  3.6   |  23  |  0.58    Ca    8.6      10 Jul 2023 05:30  Phos  3.3     07-10  Mg     1.9     07-10    TPro  8.5<H>  /  Alb  3.4  /  TBili  0.3  /  DBili  x   /  AST  22  /  ALT  21  /  AlkPhos  95  07-08    LIVER FUNCTIONS - ( 08 Jul 2023 15:24 )  Alb: 3.4 g/dL / Pro: 8.5 g/dL / ALK PHOS: 95 U/L / ALT: 21 U/L / AST: 22 U/L / GGT: x             CAPILLARY BLOOD GLUCOSE        Urinalysis Basic - ( 10 Jul 2023 05:30 )    Color: x / Appearance: x / SG: x / pH: x  Gluc: 78 mg/dL / Ketone: x  / Bili: x / Urobili: x   Blood: x / Protein: x / Nitrite: x   Leuk Esterase: x / RBC: x / WBC x   Sq Epi: x / Non Sq Epi: x / Bacteria: x        MICRODATA:    Culture - Abscess with Gram Stain (collected 09 Jul 2023 14:20)  Source: .Abscess Left abdomen abscess drainage  Gram Stain (09 Jul 2023 17:54):    Moderate Gram positive cocci in pairs, chains and clusters    Numerous white blood cells  Preliminary Report (10 Jul 2023 11:06):    Rare Escherichia coli    Rare Klebsiella aerogenes (Previously Enterobacter)    Culture in progress    Culture - Blood (collected 09 Jul 2023 00:33)  Source: .Blood Blood-Peripheral  Preliminary Report (10 Jul 2023 01:00):    No growth at 12 hours    Culture - Blood (collected 09 Jul 2023 00:33)  Source: .Blood Blood-Peripheral  Preliminary Report (10 Jul 2023 01:00):    No growth at 12 hours    Culture - Urine (collected 08 Jul 2023 15:24)  Source: Clean Catch Clean Catch (Midstream)  Final Report (09 Jul 2023 13:51):    Insignificant amount of mixed growth.        RADIOLOGY/OTHER STUDIES:   Patient is a 47y old  Female who presents with a chief complaint of perforated diverticulitis w/ abscess (10 Jul 2023 10:41)    INTERVAL EVENTS:    SUBJECTIVE:  Patient was seen and examined at bedside. Reports abdominal pain improved, no N/V, passing flatus. Denies chest pain, no palpitations, no dizziness, no SOB. Refusing blood transfusion, she reports last known Hb is from 2020, she hasn't followed with a provider since then. She reports menorrhagia improved since uterine artery embolization, denies any source of bleeding besides periods. No other complaints or events reported.    Review of systems: No fever, chills, dizziness, HA, Changes in vision, CP, dyspnea, nausea or vomiting, dysuria, changes in bowel movements, LE edema. Rest of 12 point Review of systems negative unless otherwise documented elsewhere in note.     Diet, Clear Liquid (07-10-23 @ 07:08) [Active]      MEDICATIONS:  MEDICATIONS  (STANDING):  cefTRIAXone   IVPB 1000 milliGRAM(s) IV Intermittent every 24 hours  ferrous    sulfate 325 milliGRAM(s) Oral daily  lactated ringers. 1000 milliLiter(s) (75 mL/Hr) IV Continuous <Continuous>  metroNIDAZOLE  IVPB 500 milliGRAM(s) IV Intermittent every 8 hours    MEDICATIONS  (PRN):  acetaminophen     Tablet .. 650 milliGRAM(s) Oral every 6 hours PRN Mild Pain (1 - 3), Moderate Pain (4 - 6)  ondansetron Injectable 4 milliGRAM(s) IV Push every 6 hours PRN Nausea and/or Vomiting      Allergies    aspirin (Unknown)    Intolerances        OBJECTIVE:  Vital Signs Last 24 Hrs  T(C): 36.8 (10 Jul 2023 08:30), Max: 36.8 (09 Jul 2023 20:43)  T(F): 98.2 (10 Jul 2023 08:30), Max: 98.2 (09 Jul 2023 20:43)  HR: 75 (10 Jul 2023 08:30) (75 - 84)  BP: 128/83 (10 Jul 2023 08:30) (123/77 - 133/76)  BP(mean): --  RR: 17 (10 Jul 2023 08:30) (17 - 17)  SpO2: 98% (10 Jul 2023 08:30) (95% - 98%)    Parameters below as of 10 Jul 2023 08:30  Patient On (Oxygen Delivery Method): room air      I&O's Summary    09 Jul 2023 07:01  -  10 Jul 2023 07:00  --------------------------------------------------------  IN: 2440 mL / OUT: 475 mL / NET: 1965 mL        PHYSICAL EXAM:  General: AOX3, NAD, lying in bed, speaking in full sentences, no labored breathing on RA  HEENT: AT/NC, no facial asymmetry  Lungs: no crackles, no wheezes  Heart: RRR  Abdomen: Drain in place, few cc serous fluid, soft, no tenderness, + BS  Extremities:  warm, no edema, no calf tenderness, no focal deficit     LABS:                        6.9    9.83  )-----------( 546      ( 10 Jul 2023 05:30 )             26.8     07-10    143  |  106  |  5<L>  ----------------------------<  78  3.6   |  23  |  0.58    Ca    8.6      10 Jul 2023 05:30  Phos  3.3     07-10  Mg     1.9     07-10    TPro  8.5<H>  /  Alb  3.4  /  TBili  0.3  /  DBili  x   /  AST  22  /  ALT  21  /  AlkPhos  95  07-08    LIVER FUNCTIONS - ( 08 Jul 2023 15:24 )  Alb: 3.4 g/dL / Pro: 8.5 g/dL / ALK PHOS: 95 U/L / ALT: 21 U/L / AST: 22 U/L / GGT: x             CAPILLARY BLOOD GLUCOSE        Urinalysis Basic - ( 10 Jul 2023 05:30 )    Color: x / Appearance: x / SG: x / pH: x  Gluc: 78 mg/dL / Ketone: x  / Bili: x / Urobili: x   Blood: x / Protein: x / Nitrite: x   Leuk Esterase: x / RBC: x / WBC x   Sq Epi: x / Non Sq Epi: x / Bacteria: x        MICRODATA:    Culture - Abscess with Gram Stain (collected 09 Jul 2023 14:20)  Source: .Abscess Left abdomen abscess drainage  Gram Stain (09 Jul 2023 17:54):    Moderate Gram positive cocci in pairs, chains and clusters    Numerous white blood cells  Preliminary Report (10 Jul 2023 11:06):    Rare Escherichia coli    Rare Klebsiella aerogenes (Previously Enterobacter)    Culture in progress    Culture - Blood (collected 09 Jul 2023 00:33)  Source: .Blood Blood-Peripheral  Preliminary Report (10 Jul 2023 01:00):    No growth at 12 hours    Culture - Blood (collected 09 Jul 2023 00:33)  Source: .Blood Blood-Peripheral  Preliminary Report (10 Jul 2023 01:00):    No growth at 12 hours    Culture - Urine (collected 08 Jul 2023 15:24)  Source: Clean Catch Clean Catch (Midstream)  Final Report (09 Jul 2023 13:51):    Insignificant amount of mixed growth.        RADIOLOGY/OTHER STUDIES:

## 2023-07-10 NOTE — PROGRESS NOTE ADULT - ASSESSMENT
47F PMHx uterine fibroids s/p excision, anemia, no PSHx p/w 2-week history of L sided abdominal pain. initially tachycarcic to 117 and febrile to 101.9, resolved w/ 2L NS, hemodynamically stable. On exam, Labs show 19.61, H/H 8.5/31.6, platelets 566, chemistry unremarkable, LFTs wnl. CTAP w/ PO and IV contrast demonstrates significant pericolonic inflammatory fat stranding adjacent to descending and sigmoid colon in a region of diverticulosis; collection of fluid and gas measuring approximately 9.5x6.3cm consistent w/ perforated diverticulitis w/ abscess, Hinchey class II.    NPO/IVF  CTX/Flagyl  Pain/nausea control  SCDs/OOBA/IS  AM labs  Pelvic US r/o hemorrhagic cyst 47F PMHx uterine fibroids s/p excision, anemia, no PSHx p/w 2-week history of L sided abdominal pain. initially tachycarcic to 117 and febrile to 101.9, resolved w/ 2L NS, hemodynamically stable. On exam, Labs show 19.61, H/H 8.5/31.6, platelets 566, chemistry unremarkable, LFTs wnl. CTAP w/ PO and IV contrast demonstrates significant pericolonic inflammatory fat stranding adjacent to descending and sigmoid colon in a region of diverticulosis; collection of fluid and gas measuring approximately 9.5x6.3cm consistent w/ perforated diverticulitis w/ abscess, Hinchey class II.    1 unit of blood  Fu blood cultures  CLD/IVF  CTX/Flagyl  Pain/nausea control  SCDs/OOBA/IS  AM labs

## 2023-07-10 NOTE — PROGRESS NOTE ADULT - ASSESSMENT
47F with fibroids and anemia presenting with abdominal pain and was fount to have perforated diverticulitis with abscess formation. Febrile to 101.9 and tachycardic on presentation. Labs reveal leukocytosis, anemia and hypokalemia. Imaging of the abdomen shows acute diverticulitis with associated abscess, and a complex cystic structure in the right adnexa with an enlarged fibroid uterus. Patient's presentation along with her labs and imaging are consistent with sepsis secondary to perforated diverticulitis with abscess formation.    #Sepsis secondary to perforated diverticulitis with abscess  - s/p IR drainage   - Follow up post-drainage cultures, currently E Coli and Klebsiella, follow-up sensitivities   - Currently on Ceftriaxone and Metronidazole   - Rest of management as per surgical team    #Right adnexal complex cyst, r/u hemorrhagic cyst  - TVUS reviewed  - follow-up with Gyn, will need repeat imaging post discharge     #Uterine fibroids  - Monitor for vaginal bleeding  - Gyn follow-up     #ABLA  - Patient reports chronic anemia in setting of heavy menses, she reports didn't have a CBC over the last 3 years. Denies any other bleeding,  clinically asymptomatic  Maintain active type and screen, monitor for symptoms, trend Hb, target hb >7. Patient refusing transfusion despite education. Get iron studies.  Pending resolution of infection and iron studies patient might benefit from IV Iron  Age appropriate screening with PCP as outpatient    Thrombocytosis  ? reactive in setting of above  Monitor     DVT ppx per primary team

## 2023-07-10 NOTE — DISCHARGE NOTE PROVIDER - CARE PROVIDER_API CALL
Markel Mendoza  Colon/Rectal Surgery  [address] [place] [address], [address]  [place] [address]  Phone: [phone]  Fax: [phone]  Follow Up Time:

## 2023-07-10 NOTE — DISCHARGE NOTE PROVIDER - HOSPITAL COURSE
47F PMHx uterine fibroids s/p TV excision, anemia, no PSHx p/w 2-week history of L sided abdominal pain and constipation. States her pain started 2 weeks ago, worse in LLQ, associated w/ nausea and NBNB emesis. Experienced loose BM at onset however has been constipated for 2 weeks. Denies fevers/chills, melena/hematochezia, dysuria, cp, sob.  In the ED, patient was initially tachycardic to 117 and febrile to 101.9, resolved w/ 2L NS, hemodynamically stable. On exam, soft, moderately distended, moderate LLQ ttp w/ voluntary guarding. Labs show 19.61, H/H 8.5/31.6, platelets 566, chemistry unremarkable, LFTs wnl. CTAP w/ PO and IV contrast demonstrates significant pericolonic inflammatory fat stranding adjacent to descending and sigmoid colon in a region of diverticulosis; collection of fluid and gas measuring approximately 9.5x6.3cm consistent w/ perforated diverticulitis w/ abscess; complex cystic structure in R adnexa possibly representing hemorrhagic cyst. On 7/9/23 patient underwent CT guided drainage of the abscess and a 10 [place] catheter was placed with a IVONNE suction bulb drainage. On 7/10/23 AM Hg was 6.9. Patient was asked if we had permission to transfuse with packed red blood cells but the patient adamantly denied stating that she has a history of fibroids treated with uterine artery embolization and takes iron supplements at home and that her hemoglobin is usually low. She also stated that she has had three prior infusions (last one over 5 years ago) and was instructed by a previous doctor to not receive future transfusions due to risk of adverse effects. Patient was instructed of the adverse effects of not receiving blood and patient stated that she understood and was still unwilling to receive transfusion. Throughout the rest of the hospital stay patient had full return of bowel function, was tolerating the advancement of her diet without nausea or vomiting, and had adequate pain control. On day of discharge patient was stable to be d/c'd home.    INCOMPLETE last revised 7/11/23 47F PMHx uterine fibroids s/p TV excision, anemia, no PSHx p/w 2-week history of L sided abdominal pain and constipation. States her pain started 2 weeks ago, worse in LLQ, associated w/ nausea and NBNB emesis. Experienced loose BM at onset however has been constipated for 2 weeks. Denies fevers/chills, melena/hematochezia, dysuria, cp, sob.  In the ED, patient was initially tachycardic to 117 and febrile to 101.9, resolved w/ 2L NS, hemodynamically stable. On exam, soft, moderately distended, moderate LLQ ttp w/ voluntary guarding. Labs show 19.61, H/H 8.5/31.6, platelets 566, chemistry unremarkable, LFTs wnl. CTAP w/ PO and IV contrast demonstrates significant pericolonic inflammatory fat stranding adjacent to descending and sigmoid colon in a region of diverticulosis; collection of fluid and gas measuring approximately 9.5x6.3cm consistent w/ perforated diverticulitis w/ abscess; complex cystic structure in R adnexa possibly representing hemorrhagic cyst. On 7/9/23 patient underwent CT guided drainage of the abscess and a 10 [place] catheter was placed with a IVONNE suction bulb drainage. On 7/10/23 AM Hg was 6.9. Patient was asked if we had permission to transfuse with packed red blood cells but the patient adamantly denied stating that she has a history of fibroids treated with uterine artery embolization and takes iron supplements at home and that her hemoglobin is usually low. She also stated that she has had three prior infusions (last one over 5 years ago) and was instructed by a previous doctor to not receive future transfusions due to risk of adverse effects. Patient was instructed of the adverse effects of not receiving blood and patient stated that she understood and was still unwilling to receive transfusion. Throughout the rest of the hospital stay patient had full return of bowel function, was tolerating the advancement of her diet without nausea or vomiting, and had adequate pain control. On day of discharge patient was stable to be d/c'd home.   47F PMHx uterine fibroids s/p TV excision, anemia, no PSHx p/w 2-week history of L sided abdominal pain and constipation. States her pain started 2 weeks ago, worse in LLQ, associated w/ nausea and NBNB emesis. Experienced loose BM at onset however has been constipated for 2 weeks. Denies fevers/chills, melena/hematochezia, dysuria, cp, sob.  In the ED, patient was initially tachycardic to 117 and febrile to 101.9, resolved w/ 2L NS, hemodynamically stable. On exam, soft, moderately distended, moderate LLQ ttp w/ voluntary guarding. Labs show 19.61, H/H 8.5/31.6, platelets 566, chemistry unremarkable, LFTs wnl. CTAP w/ PO and IV contrast demonstrates significant pericolonic inflammatory fat stranding adjacent to descending and sigmoid colon in a region of diverticulosis; collection of fluid and gas measuring approximately 9.5x6.3cm consistent w/ perforated diverticulitis w/ abscess; complex cystic structure in R adnexa possibly representing hemorrhagic cyst. On 7/9/23 patient underwent CT guided drainage of the abscess and a 10 [place] catheter was placed with a IVONNE suction bulb drainage. On 7/10/23 AM Hg was 6.9. Patient was asked if we had permission to transfuse with packed red blood cells but the patient adamantly denied stating that she has a history of fibroids treated with uterine artery embolization and takes iron supplements at home and that her hemoglobin is usually low. She also stated that she has had three prior infusions (last one over 5 years ago) and was instructed by a previous doctor to not receive future transfusions due to risk of adverse effects. Patient was instructed of the adverse effects of not receiving blood and patient stated that she understood and was still unwilling to receive transfusion. Throughout the rest of the hospital stay patient had full return of bowel function, was tolerating the advancement of her diet without nausea or vomiting, and had adequate pain control. On day of discharge patient was stable to be d/c'd home.  She was aware fo follow up plan with general surgery for monitoring of the drain and at the next office visit we will arrange her follow up with [facility] as well.

## 2023-07-10 NOTE — DISCHARGE NOTE PROVIDER - NSDCCPCAREPLAN_GEN_ALL_CORE_FT
PRINCIPAL DISCHARGE DIAGNOSIS  Diagnosis: Diverticulitis of intestine with abscess  Assessment and Plan of Treatment:

## 2023-07-10 NOTE — PROGRESS NOTE ADULT - SUBJECTIVE AND OBJECTIVE BOX
47F PMHx uterine fibroids s/p excision, anemia, no PSHx p/w 2-week history of L sided abdominal pain. initially tachycarcic to 117 and febrile to 101.9, resolved w/ 2L NS, hemodynamically stable. On exam, Labs show 19.61, H/H 8.5/31.6, platelets 566, chemistry unremarkable, LFTs wnl. CTAP w/ PO and IV contrast demonstrates significant pericolonic inflammatory fat stranding adjacent to descending and sigmoid colon in a region of diverticulosis; collection of fluid and gas measuring approximately 9.5x6.3cm consistent w/ perforated diverticulitis w/ abscess post IR drainage and drain placement 7/9/2023.    75cc output since placement. Flushed easily with 5cc NS.

## 2023-07-10 NOTE — DISCHARGE NOTE PROVIDER - NSDCFUADDAPPT_GEN_ALL_CORE_FT
Please follow up with Dr. Mendoza in one week; you may call the office to make an appointment at your earliest convenience.

## 2023-07-10 NOTE — DISCHARGE NOTE PROVIDER - NSDCMRMEDTOKEN_GEN_ALL_CORE_FT
ferrous sulfate 325 mg (65 mg elemental iron) oral tablet: 1 tab(s) orally once a day  ibuprofen 600 mg oral tablet: 1 tab(s) orally every 6 hours, As Needed -for moderate pain MDD:4   ondansetron 4 mg oral tablet: 1 tab(s) orally every 8 hours, As Needed -for nausea MDD:3   oxyCODONE 5 mg oral tablet: 1 tab(s) orally every 6 hours, As Needed -for moderate pain MDD:4    ferrous sulfate 325 mg (65 mg elemental iron) oral tablet: 1 tab(s) orally once a day   acetaminophen 325 mg oral tablet: 2 tab(s) orally every 6 hours As needed Mild Pain (1 - 3), Moderate Pain (4 - 6)  cefpodoxime 200 mg oral tablet: 1 tab(s) orally 2 times a day  ferrous sulfate 325 mg (65 mg elemental iron) oral tablet: 1 tab(s) orally once a day  metroNIDAZOLE 500 mg oral tablet: 1 tab(s) orally 3 times a day

## 2023-07-10 NOTE — PROGRESS NOTE ADULT - SUBJECTIVE AND OBJECTIVE BOX
STATUS POST:      POST OPERATIVE DAY #:     SUBJECTIVE:     Vital Signs Last 24 Hrs  T(C): 36.6 (10 Jul 2023 05:01), Max: 37 (09 Jul 2023 08:55)  T(F): 97.8 (10 Jul 2023 05:01), Max: 98.6 (09 Jul 2023 08:55)  HR: 82 (10 Jul 2023 05:01) (81 - 93)  BP: 130/84 (10 Jul 2023 05:01) (123/77 - 133/76)  BP(mean): --  RR: 17 (10 Jul 2023 05:01) (17 - 17)  SpO2: 96% (10 Jul 2023 05:01) (95% - 99%)    Parameters below as of 10 Jul 2023 05:01  Patient On (Oxygen Delivery Method): room air        I&O's Summary    08 Jul 2023 07:01  -  09 Jul 2023 07:00  --------------------------------------------------------  IN: 700 mL / OUT: 0 mL / NET: 700 mL    09 Jul 2023 07:01  -  10 Jul 2023 06:17  --------------------------------------------------------  IN: 2440 mL / OUT: 75 mL / NET: 2365 mL        Physical Exam:  General Appearance: Appears well, NAD  Pulmonary: Nonlabored breathing, no respiratory distress  Cardiovascular: NSR  Abdomen: Soft, nondisteded, appropriate incisional tenderness, dressings clean and dry and intact  Extremities: WWP, SCD's in place     LABS:                        6.9    9.83  )-----------( 546      ( 10 Jul 2023 05:30 )             26.8     07-09    140  |  103  |  3<L>  ----------------------------<  100<H>  3.2<L>   |  25  |  0.65    Ca    8.3<L>      09 Jul 2023 05:30  Phos  3.4     07-09  Mg     1.7     07-09    TPro  8.5<H>  /  Alb  3.4  /  TBili  0.3  /  DBili  x   /  AST  22  /  ALT  21  /  AlkPhos  95  07-08      Urinalysis Basic - ( 09 Jul 2023 05:30 )    Color: x / Appearance: x / SG: x / pH: x  Gluc: 100 mg/dL / Ketone: x  / Bili: x / Urobili: x   Blood: x / Protein: x / Nitrite: x   Leuk Esterase: x / RBC: x / WBC x   Sq Epi: x / Non Sq Epi: x / Bacteria: x       STATUS POST:  IR drainage of abscess    POST OPERATIVE DAY #: 1    SUBJECTIVE: Patient seen and evaluated at bedside by the chief resident. Patient reports feeling better today. Patient endorses passing flatus but denies any bowel movement. Patient denies any nausea or vomiting. Patient on a clear liquid diet.    Vital Signs Last 24 Hrs  T(C): 36.6 (10 Jul 2023 05:01), Max: 37 (09 Jul 2023 08:55)  T(F): 97.8 (10 Jul 2023 05:01), Max: 98.6 (09 Jul 2023 08:55)  HR: 82 (10 Jul 2023 05:01) (81 - 93)  BP: 130/84 (10 Jul 2023 05:01) (123/77 - 133/76)  BP(mean): --  RR: 17 (10 Jul 2023 05:01) (17 - 17)  SpO2: 96% (10 Jul 2023 05:01) (95% - 99%)    Parameters below as of 10 Jul 2023 05:01  Patient On (Oxygen Delivery Method): room air        I&O's Summary    08 Jul 2023 07:01  -  09 Jul 2023 07:00  --------------------------------------------------------  IN: 700 mL / OUT: 0 mL / NET: 700 mL    09 Jul 2023 07:01  -  10 Jul 2023 06:17  --------------------------------------------------------  IN: 2440 mL / OUT: 75 mL / NET: 2365 mL        Physical Exam:  General Appearance: Appears well, NAD  Pulmonary: Nonlabored breathing, no respiratory distress  Cardiovascular: NSR  Abdomen: Soft, nondistended, non-tender to palpation, drain from IR in place  Extremities: SCD's in place     LABS:                        6.9    9.83  )-----------( 546      ( 10 Jul 2023 05:30 )             26.8     07-09    140  |  103  |  3<L>  ----------------------------<  100<H>  3.2<L>   |  25  |  0.65    Ca    8.3<L>      09 Jul 2023 05:30  Phos  3.4     07-09  Mg     1.7     07-09    TPro  8.5<H>  /  Alb  3.4  /  TBili  0.3  /  DBili  x   /  AST  22  /  ALT  21  /  AlkPhos  95  07-08      Urinalysis Basic - ( 09 Jul 2023 05:30 )    Color: x / Appearance: x / SG: x / pH: x  Gluc: 100 mg/dL / Ketone: x  / Bili: x / Urobili: x   Blood: x / Protein: x / Nitrite: x   Leuk Esterase: x / RBC: x / WBC x   Sq Epi: x / Non Sq Epi: x / Bacteria: x

## 2023-07-11 ENCOUNTER — TRANSCRIPTION ENCOUNTER (OUTPATIENT)
Age: 48
End: 2023-07-11

## 2023-07-11 VITALS
SYSTOLIC BLOOD PRESSURE: 136 MMHG | OXYGEN SATURATION: 97 % | DIASTOLIC BLOOD PRESSURE: 85 MMHG | RESPIRATION RATE: 17 BRPM | HEART RATE: 96 BPM | TEMPERATURE: 98 F

## 2023-07-11 LAB
-  AMPICILLIN/SULBACTAM: SIGNIFICANT CHANGE UP
-  AMPICILLIN: SIGNIFICANT CHANGE UP
-  CEFAZOLIN: SIGNIFICANT CHANGE UP
-  CEFTRIAXONE: SIGNIFICANT CHANGE UP
-  CIPROFLOXACIN: SIGNIFICANT CHANGE UP
-  ERTAPENEM: SIGNIFICANT CHANGE UP
-  GENTAMICIN: SIGNIFICANT CHANGE UP
-  PIPERACILLIN/TAZOBACTAM: SIGNIFICANT CHANGE UP
-  TOBRAMYCIN: SIGNIFICANT CHANGE UP
-  TRIMETHOPRIM/SULFAMETHOXAZOLE: SIGNIFICANT CHANGE UP
ANION GAP SERPL CALC-SCNC: 18 MMOL/L — HIGH (ref 5–17)
ANION GAP SERPL CALC-SCNC: 8 MMOL/L — SIGNIFICANT CHANGE UP (ref 5–17)
BUN SERPL-MCNC: 4 MG/DL — LOW (ref 7–23)
BUN SERPL-MCNC: 5 MG/DL — LOW (ref 7–23)
CALCIUM SERPL-MCNC: 8.6 MG/DL — SIGNIFICANT CHANGE UP (ref 8.4–10.5)
CALCIUM SERPL-MCNC: 8.8 MG/DL — SIGNIFICANT CHANGE UP (ref 8.4–10.5)
CHLORIDE SERPL-SCNC: 103 MMOL/L — SIGNIFICANT CHANGE UP (ref 96–108)
CHLORIDE SERPL-SCNC: 104 MMOL/L — SIGNIFICANT CHANGE UP (ref 96–108)
CO2 SERPL-SCNC: 19 MMOL/L — LOW (ref 22–31)
CO2 SERPL-SCNC: 26 MMOL/L — SIGNIFICANT CHANGE UP (ref 22–31)
CREAT SERPL-MCNC: 0.55 MG/DL — SIGNIFICANT CHANGE UP (ref 0.5–1.3)
CREAT SERPL-MCNC: 0.58 MG/DL — SIGNIFICANT CHANGE UP (ref 0.5–1.3)
EGFR: 113 ML/MIN/1.73M2 — SIGNIFICANT CHANGE UP
EGFR: 114 ML/MIN/1.73M2 — SIGNIFICANT CHANGE UP
GLUCOSE SERPL-MCNC: 73 MG/DL — SIGNIFICANT CHANGE UP (ref 70–99)
GLUCOSE SERPL-MCNC: 96 MG/DL — SIGNIFICANT CHANGE UP (ref 70–99)
HCT VFR BLD CALC: 28.3 % — LOW (ref 34.5–45)
HGB BLD-MCNC: 7.5 G/DL — LOW (ref 11.5–15.5)
MAGNESIUM SERPL-MCNC: 1.7 MG/DL — SIGNIFICANT CHANGE UP (ref 1.6–2.6)
MAGNESIUM SERPL-MCNC: 1.8 MG/DL — SIGNIFICANT CHANGE UP (ref 1.6–2.6)
MCHC RBC-ENTMCNC: 19.5 PG — LOW (ref 27–34)
MCHC RBC-ENTMCNC: 26.5 GM/DL — LOW (ref 32–36)
MCV RBC AUTO: 73.7 FL — LOW (ref 80–100)
METHOD TYPE: SIGNIFICANT CHANGE UP
NRBC # BLD: 0 /100 WBCS — SIGNIFICANT CHANGE UP (ref 0–0)
PHOSPHATE SERPL-MCNC: 2.4 MG/DL — LOW (ref 2.5–4.5)
PHOSPHATE SERPL-MCNC: 2.9 MG/DL — SIGNIFICANT CHANGE UP (ref 2.5–4.5)
PLATELET # BLD AUTO: 683 K/UL — HIGH (ref 150–400)
POTASSIUM SERPL-MCNC: 3.8 MMOL/L — SIGNIFICANT CHANGE UP (ref 3.5–5.3)
POTASSIUM SERPL-MCNC: 3.8 MMOL/L — SIGNIFICANT CHANGE UP (ref 3.5–5.3)
POTASSIUM SERPL-SCNC: 3.8 MMOL/L — SIGNIFICANT CHANGE UP (ref 3.5–5.3)
POTASSIUM SERPL-SCNC: 3.8 MMOL/L — SIGNIFICANT CHANGE UP (ref 3.5–5.3)
RBC # BLD: 3.84 M/UL — SIGNIFICANT CHANGE UP (ref 3.8–5.2)
RBC # FLD: 19.5 % — HIGH (ref 10.3–14.5)
SODIUM SERPL-SCNC: 138 MMOL/L — SIGNIFICANT CHANGE UP (ref 135–145)
SODIUM SERPL-SCNC: 140 MMOL/L — SIGNIFICANT CHANGE UP (ref 135–145)
WBC # BLD: 7.79 K/UL — SIGNIFICANT CHANGE UP (ref 3.8–10.5)
WBC # FLD AUTO: 7.79 K/UL — SIGNIFICANT CHANGE UP (ref 3.8–10.5)

## 2023-07-11 PROCEDURE — 80053 COMPREHEN METABOLIC PANEL: CPT

## 2023-07-11 PROCEDURE — 85025 COMPLETE CBC W/AUTO DIFF WBC: CPT

## 2023-07-11 PROCEDURE — 81025 URINE PREGNANCY TEST: CPT

## 2023-07-11 PROCEDURE — 87070 CULTURE OTHR SPECIMN AEROBIC: CPT

## 2023-07-11 PROCEDURE — 49406 IMAGE CATH FLUID PERI/RETRO: CPT

## 2023-07-11 PROCEDURE — 87186 SC STD MICRODIL/AGAR DIL: CPT

## 2023-07-11 PROCEDURE — 80048 BASIC METABOLIC PNL TOTAL CA: CPT

## 2023-07-11 PROCEDURE — 87075 CULTR BACTERIA EXCEPT BLOOD: CPT

## 2023-07-11 PROCEDURE — 93005 ELECTROCARDIOGRAM TRACING: CPT

## 2023-07-11 PROCEDURE — 84100 ASSAY OF PHOSPHORUS: CPT

## 2023-07-11 PROCEDURE — 87040 BLOOD CULTURE FOR BACTERIA: CPT

## 2023-07-11 PROCEDURE — 99285 EMERGENCY DEPT VISIT HI MDM: CPT | Mod: 25

## 2023-07-11 PROCEDURE — 99232 SBSQ HOSP IP/OBS MODERATE 35: CPT

## 2023-07-11 PROCEDURE — 81001 URINALYSIS AUTO W/SCOPE: CPT

## 2023-07-11 PROCEDURE — 96374 THER/PROPH/DIAG INJ IV PUSH: CPT

## 2023-07-11 PROCEDURE — 85027 COMPLETE CBC AUTOMATED: CPT

## 2023-07-11 PROCEDURE — 83735 ASSAY OF MAGNESIUM: CPT

## 2023-07-11 PROCEDURE — 83690 ASSAY OF LIPASE: CPT

## 2023-07-11 PROCEDURE — 86850 RBC ANTIBODY SCREEN: CPT

## 2023-07-11 PROCEDURE — 86901 BLOOD TYPING SEROLOGIC RH(D): CPT

## 2023-07-11 PROCEDURE — C1729: CPT

## 2023-07-11 PROCEDURE — 86900 BLOOD TYPING SEROLOGIC ABO: CPT

## 2023-07-11 PROCEDURE — 36415 COLL VENOUS BLD VENIPUNCTURE: CPT

## 2023-07-11 PROCEDURE — 74177 CT ABD & PELVIS W/CONTRAST: CPT | Mod: MA

## 2023-07-11 PROCEDURE — 87205 SMEAR GRAM STAIN: CPT

## 2023-07-11 PROCEDURE — 76856 US EXAM PELVIC COMPLETE: CPT

## 2023-07-11 PROCEDURE — 87086 URINE CULTURE/COLONY COUNT: CPT

## 2023-07-11 RX ORDER — POTASSIUM CHLORIDE 20 MEQ
20 PACKET (EA) ORAL ONCE
Refills: 0 | Status: COMPLETED | OUTPATIENT
Start: 2023-07-11 | End: 2023-07-11

## 2023-07-11 RX ORDER — CEFPODOXIME PROXETIL 100 MG
100 TABLET ORAL EVERY 12 HOURS
Refills: 0 | Status: DISCONTINUED | OUTPATIENT
Start: 2023-07-11 | End: 2023-07-11

## 2023-07-11 RX ORDER — METRONIDAZOLE 500 MG
1 TABLET ORAL
Qty: 42 | Refills: 0
Start: 2023-07-11 | End: 2023-07-24

## 2023-07-11 RX ORDER — MAGNESIUM SULFATE 500 MG/ML
1 VIAL (ML) INJECTION ONCE
Refills: 0 | Status: COMPLETED | OUTPATIENT
Start: 2023-07-11 | End: 2023-07-11

## 2023-07-11 RX ORDER — METRONIDAZOLE 500 MG
500 TABLET ORAL EVERY 8 HOURS
Refills: 0 | Status: DISCONTINUED | OUTPATIENT
Start: 2023-07-11 | End: 2023-07-11

## 2023-07-11 RX ORDER — CEFPODOXIME PROXETIL 100 MG
200 TABLET ORAL EVERY 12 HOURS
Refills: 0 | Status: DISCONTINUED | OUTPATIENT
Start: 2023-07-11 | End: 2023-07-11

## 2023-07-11 RX ORDER — ACETAMINOPHEN 500 MG
2 TABLET ORAL
Qty: 0 | Refills: 0 | DISCHARGE
Start: 2023-07-11

## 2023-07-11 RX ORDER — CEFPODOXIME PROXETIL 100 MG
1 TABLET ORAL
Qty: 28 | Refills: 0
Start: 2023-07-11 | End: 2023-07-24

## 2023-07-11 RX ADMIN — Medication 100 MILLIGRAM(S): at 09:52

## 2023-07-11 RX ADMIN — Medication 20 MILLIEQUIVALENT(S): at 08:51

## 2023-07-11 RX ADMIN — Medication 100 MILLIGRAM(S): at 00:32

## 2023-07-11 RX ADMIN — Medication 325 MILLIGRAM(S): at 11:01

## 2023-07-11 RX ADMIN — CEFTRIAXONE 100 MILLIGRAM(S): 500 INJECTION, POWDER, FOR SOLUTION INTRAMUSCULAR; INTRAVENOUS at 02:32

## 2023-07-11 RX ADMIN — Medication 500 MILLIGRAM(S): at 09:51

## 2023-07-11 RX ADMIN — Medication 100 GRAM(S): at 08:51

## 2023-07-11 RX ADMIN — Medication 1000 MILLIGRAM(S): at 00:21

## 2023-07-11 NOTE — PROGRESS NOTE ADULT - ASSESSMENT
47F with fibroids and anemia presenting with abdominal pain and was fount to have perforated diverticulitis with abscess formation. Febrile to 101.9 and tachycardic on presentation. Labs reveal leukocytosis, anemia and hypokalemia. Imaging of the abdomen shows acute diverticulitis with associated abscess, and a complex cystic structure in the right adnexa with an enlarged fibroid uterus. Patient's presentation along with her labs and imaging are consistent with sepsis secondary to perforated diverticulitis with abscess formation.    #Sepsis secondary to perforated diverticulitis with abscess  - s/p IR drainage   - Follow up post-drainage cultures, currently E Coli and Klebsiella, follow-up sensitivities   - Currently on Cefpodoxime, Metronidazole   - Rest of management as per surgical team    #Right adnexal complex cyst, r/u hemorrhagic cyst  - TVUS reviewed  - follow-up with Gyn, will need repeat imaging post discharge     #Uterine fibroids  - Monitor for vaginal bleeding  - Gyn follow-up     #ABLA  - Patient reports chronic anemia in setting of heavy menses, she reports didn't have a CBC over the last 3 years. Denies any other bleeding,  clinically asymptomatic  Maintain active type and screen, monitor for symptoms, trend Hb, target hb >7. Patient refusing transfusion despite education. Get iron studies.  Pending resolution of infection and iron studies patient might benefit from IV Iron  Age appropriate screening with PCP as outpatient    Thrombocytosis  ? reactive in setting of above  Monitor     DVT ppx per primary team    47F with fibroids and anemia presenting with abdominal pain and was fount to have perforated diverticulitis with abscess formation. Febrile to 101.9 and tachycardic on presentation. Labs reveal leukocytosis, anemia and hypokalemia. Imaging of the abdomen shows acute diverticulitis with associated abscess, and a complex cystic structure in the right adnexa with an enlarged fibroid uterus. Patient's presentation along with her labs and imaging are consistent with sepsis secondary to perforated diverticulitis with abscess formation.    #Sepsis secondary to perforated diverticulitis with abscess  - s/p IR drainage   - Follow up post-drainage cultures, currently E Coli and Klebsiella, follow-up sensitivities   - Currently on Cefpodoxime, Metronidazole   - Rest of management as per surgical team    #HAGMA  Clinically asymptomatic, abdomen soft, non-tender, tolerating PO   Get repeat CHEM, further management pending trend     #Right adnexal complex cyst, r/u hemorrhagic cyst  - TVUS reviewed  - follow-up with Gyn, will need repeat imaging post discharge     #Uterine fibroids  - Monitor for vaginal bleeding  - Gyn follow-up     #ABLA  - Patient reports chronic anemia in setting of heavy menses, she reports didn't have a CBC over the last 3 years. Denies any other bleeding,  clinically asymptomatic  Maintain active type and screen, monitor for symptoms, trend Hb, target hb >7. Patient refusing transfusion despite education. Get iron studies.  Pending resolution of infection and iron studies patient might benefit from IV Iron  Age appropriate screening with PCP as outpatient    Thrombocytosis  ? reactive in setting of above  Monitor     DVT ppx per primary team

## 2023-07-11 NOTE — DISCHARGE NOTE NURSING/CASE MANAGEMENT/SOCIAL WORK - PATIENT PORTAL LINK FT
You can access the FollowMyHealth Patient Portal offered by [facility] by registering at the following website: [email or web address]. By joining [email or web address] FollowMyHealth portal, you will also be able to view your health information using other applications (apps) compatible with our system.

## 2023-07-11 NOTE — PROGRESS NOTE ADULT - SUBJECTIVE AND OBJECTIVE BOX
STATUS POST:  IR drainage of abscess, perforated diverticulitis    POST OPERATIVE DAY #: 2    SUBJECTIVE: Patient seen and evaluated at bedside this morning by the chief resident. Patient reports pain is improved. Drain is in place. Patient is tolerating clear liquid diet. Patient endorses a bowel movement. Patient denies nausea and vomiting.    Vital Signs Last 24 Hrs  T(C): 36.9 (11 Jul 2023 05:00), Max: 37.1 (10 Jul 2023 13:43)  T(F): 98.5 (11 Jul 2023 05:00), Max: 98.8 (10 Jul 2023 13:43)  HR: 71 (11 Jul 2023 05:00) (71 - 81)  BP: 134/80 (11 Jul 2023 05:00) (120/82 - 134/80)  BP(mean): 98 (11 Jul 2023 05:00) (97 - 98)  RR: 17 (11 Jul 2023 05:00) (17 - 19)  SpO2: 94% (11 Jul 2023 05:00) (94% - 98%)    Parameters below as of 11 Jul 2023 05:00  Patient On (Oxygen Delivery Method): room air      I&O's Summary    10 Jul 2023 07:01  -  11 Jul 2023 07:00  --------------------------------------------------------  IN: 2155 mL / OUT: 440 mL / NET: 1715 mL        Physical Exam:  General Appearance: Appears well, NAD  Pulmonary: Nonlabored breathing, no respiratory distress  Cardiovascular: NSR  Abdomen: Soft, nondisteded, tender to palpation around drain site, 2-3cc serous-purulent drainage   Extremities: WWP, SCD's in place     LABS:                        6.9    9.83  )-----------( 546      ( 10 Jul 2023 05:30 )             26.8     07-10    143  |  106  |  5<L>  ----------------------------<  78  3.6   |  23  |  0.58    Ca    8.6      10 Jul 2023 05:30  Phos  3.3     07-10  Mg     1.9     07-10        Urinalysis Basic - ( 10 Jul 2023 05:30 )    Color: x / Appearance: x / SG: x / pH: x  Gluc: 78 mg/dL / Ketone: x  / Bili: x / Urobili: x   Blood: x / Protein: x / Nitrite: x   Leuk Esterase: x / RBC: x / WBC x   Sq Epi: x / Non Sq Epi: x / Bacteria: x

## 2023-07-11 NOTE — PROGRESS NOTE ADULT - SUBJECTIVE AND OBJECTIVE BOX
Patient is a 47y old  Female who presents with a chief complaint of perforated diverticulitis w/ abscess (11 Jul 2023 07:11)    INTERVAL EVENTS:    SUBJECTIVE:  Patient was seen and examined at bedside. Reports feeling improved, denies abdominal pain, no N/V, having BM. No other complaints or events reported.    Review of systems: No fever, chills, dizziness, HA, Changes in vision, CP, dyspnea, nausea or vomiting, dysuria, changes in bowel movements, LE edema. Rest of 12 point Review of systems negative unless otherwise documented elsewhere in note.     Diet, Low Fiber:   Lacto-Ovo Veg (Accepts Milk Prod., Eggs) (07-11-23 @ 07:23) [Active]      MEDICATIONS:  MEDICATIONS  (STANDING):  cefpodoxime 200 milliGRAM(s) Oral every 12 hours  ferrous    sulfate 325 milliGRAM(s) Oral daily  lactated ringers. 1000 milliLiter(s) (50 mL/Hr) IV Continuous <Continuous>  metroNIDAZOLE    Tablet 500 milliGRAM(s) Oral every 8 hours    MEDICATIONS  (PRN):  acetaminophen     Tablet .. 650 milliGRAM(s) Oral every 6 hours PRN Mild Pain (1 - 3), Moderate Pain (4 - 6)  ondansetron Injectable 4 milliGRAM(s) IV Push every 6 hours PRN Nausea and/or Vomiting      Allergies    aspirin (Unknown)    Intolerances        OBJECTIVE:  Vital Signs Last 24 Hrs  T(C): 36.8 (11 Jul 2023 08:20), Max: 37.1 (10 Jul 2023 13:43)  T(F): 98.2 (11 Jul 2023 08:20), Max: 98.8 (10 Jul 2023 13:43)  HR: 96 (11 Jul 2023 08:20) (71 - 96)  BP: 136/85 (11 Jul 2023 08:20) (120/82 - 136/85)  BP(mean): 98 (11 Jul 2023 05:00) (97 - 98)  RR: 17 (11 Jul 2023 08:20) (17 - 19)  SpO2: 97% (11 Jul 2023 08:20) (94% - 98%)    Parameters below as of 11 Jul 2023 08:20  Patient On (Oxygen Delivery Method): room air      I&O's Summary    10 Jul 2023 07:01  -  11 Jul 2023 07:00  --------------------------------------------------------  IN: 2155 mL / OUT: 440 mL / NET: 1715 mL    11 Jul 2023 07:01  -  11 Jul 2023 10:23  --------------------------------------------------------  IN: 310 mL / OUT: 0 mL / NET: 310 mL        PHYSICAL EXAM:  General: AOX3, NAD, lying in bed, speaking in full sentences, no labored breathing on RA  HEENT: AT/NC, no facial asymmetry  Lungs: no crackles, no wheezes  Heart: RRR  Abdomen: Drain in place with few cc of sero-purulent fluid, soft, no tenderness, + BS  Extremities:  warm, no edema, no tenderness, no calf tenderness, no focal deficit     LABS:                        7.5    7.79  )-----------( 683      ( 11 Jul 2023 05:30 )             28.3     07-11    140  |  103  |  5<L>  ----------------------------<  73  3.8   |  19<L>  |  0.58    Ca    8.6      11 Jul 2023 05:30  Phos  2.9     07-11  Mg     1.7     07-11          CAPILLARY BLOOD GLUCOSE        Urinalysis Basic - ( 11 Jul 2023 05:30 )    Color: x / Appearance: x / SG: x / pH: x  Gluc: 73 mg/dL / Ketone: x  / Bili: x / Urobili: x   Blood: x / Protein: x / Nitrite: x   Leuk Esterase: x / RBC: x / WBC x   Sq Epi: x / Non Sq Epi: x / Bacteria: x        MICRODATA:    Culture - Abscess with Gram Stain (collected 09 Jul 2023 14:20)  Source: .Abscess Left abdomen abscess drainage  Gram Stain (09 Jul 2023 17:54):    Moderate Gram positive cocci in pairs, chains and clusters    Numerous white blood cells  Preliminary Report (10 Jul 2023 11:06):    Rare Escherichia coli    Rare Klebsiella aerogenes (Previously Enterobacter)    Culture in progress    Culture - Blood (collected 09 Jul 2023 00:33)  Source: .Blood Blood-Peripheral  Preliminary Report (10 Jul 2023 13:01):    No growth at 1 day.    Culture - Blood (collected 09 Jul 2023 00:33)  Source: .Blood Blood-Peripheral  Preliminary Report (10 Jul 2023 13:01):    No growth at 1 day.    Culture - Urine (collected 08 Jul 2023 15:24)  Source: Clean Catch Clean Catch (Midstream)  Final Report (09 Jul 2023 13:51):    Insignificant amount of mixed growth.        RADIOLOGY/OTHER STUDIES:

## 2023-07-11 NOTE — PROGRESS NOTE ADULT - SUBJECTIVE AND OBJECTIVE BOX
47F PMHx uterine fibroids s/p excision, anemia, no PSHx p/w 2-week history of L sided abdominal pain. initially tachycarcic to 117 and febrile to 101.9, resolved w/ 2L NS, hemodynamically stable. On exam, Labs show 19.61, H/H 8.5/31.6, platelets 566, chemistry unremarkable, LFTs wnl. CTAP w/ PO and IV contrast demonstrates significant pericolonic inflammatory fat stranding adjacent to descending and sigmoid colon in a region of diverticulosis; collection of fluid and gas measuring approximately 9.5x6.3cm consistent w/ perforated diverticulitis w/ abscess post IR drainage and drain placement 7/9/2023.    40 cc output last 24 hrs, previously 75 cc Flushed easily with 5cc NS.

## 2023-07-11 NOTE — PROGRESS NOTE ADULT - ASSESSMENT
47F PMHx uterine fibroids s/p excision, anemia, no PSHx p/w perforated diverticulitis w/ abscess, Hinchey class II.    low residue diet  IVF  CTX/Flagyl  Pain/nausea control  SCDs/OOBA/IS  AM labs

## 2023-07-11 NOTE — DISCHARGE NOTE NURSING/CASE MANAGEMENT/SOCIAL WORK - NSDCPEFALRISK_GEN_ALL_CORE
For information on Fall & Injury Prevention, visit: [email or web address] OR  [email or web address] OR  https://www.cdc.gov/steadi/patient.html

## 2023-07-11 NOTE — PROGRESS NOTE ADULT - REASON FOR ADMISSION
perforated diverticulitis w/ abscess

## 2023-07-11 NOTE — CHART NOTE - NSCHARTNOTEFT_GEN_A_CORE
Patient seen and examined bedside.    Does not report any acute complaints. Denies NV.     Bowel function: +F/-BM but reports sensation of needing to have BM.     Vital Signs Last 24 Hrs  T(C): 37.1 (10 Jul 2023 13:43), Max: 37.1 (10 Jul 2023 13:43)  T(F): 98.8 (10 Jul 2023 13:43), Max: 98.8 (10 Jul 2023 13:43)  HR: 79 (10 Jul 2023 13:43) (75 - 84)  BP: 131/86 (10 Jul 2023 13:43) (123/77 - 133/76)  BP(mean): --  RR: 18 (10 Jul 2023 13:43) (17 - 18)  SpO2: 97% (10 Jul 2023 13:43) (95% - 98%)    Parameters below as of 10 Jul 2023 13:43  Patient On (Oxygen Delivery Method): room air        Abdomen soft, nondistended, appropriate tenderness around drain site.     Will continue to monitor.
Pt with hemoglobin of 6.9 on morning of 7/10. Discussed with patient need for blood transfusion in setting of anemia. Per patient, she has had 3 previous blood transfusions and was told during her last transfusion that she should not get any more due to risk of serious complications. Pt currently refusing blood transfusion. Pt educated on statistical risks associated with receiving blood transfusion. Also discussed risk of not being transfused including lightheadedness, dizziness, potential syncope, ischemia, end organ damage, and death. Pt AAOx3 and continues to refuse transfusion at this time. Will continue to follow with repeat CBC as indicated and start on IV iron. Pt open to further conversation regarding blood transfusion if hemoglobin continues to drop.
Serial Abdominal Exam    Patient seen and examined at bedside. Patient found resting comfortably.    Patient states that her pain is unchanged from the previous exam. She endorses soreness around her drain site but no other abdominal pain. She denies nausea and vomiting.    Bowel function: +f/+bm    Vital Signs Last 24 Hrs  T(C): 36.9 (10 Jul 2023 20:30), Max: 37.1 (10 Jul 2023 13:43)  T(F): 98.4 (10 Jul 2023 20:30), Max: 98.8 (10 Jul 2023 13:43)  HR: 80 (10 Jul 2023 20:30) (75 - 82)  BP: 130/80 (10 Jul 2023 20:30) (120/82 - 131/86)  BP(mean): 97 (10 Jul 2023 20:30) (97 - 97)  RR: 17 (10 Jul 2023 20:30) (17 - 19)  SpO2: 98% (10 Jul 2023 20:30) (96% - 98%)    Parameters below as of 10 Jul 2023 20:30  Patient On (Oxygen Delivery Method): room air    Abdomen soft, distended, nontender.    Will continue to monitor.
Serial Abdominal Exam - 20:15    Patient seen and examined at bedside. Patient found resting comfortably.    She is tolerating her clear liquid diet well, she denies nausea and vomiting.    Bowel function: +f/+bm     Vital Signs Last 24 Hrs  T(C): 36.8 (10 Jul 2023 17:41), Max: 37.1 (10 Jul 2023 13:43)  T(F): 98.3 (10 Jul 2023 17:41), Max: 98.8 (10 Jul 2023 13:43)  HR: 81 (10 Jul 2023 17:41) (75 - 84)  BP: 120/82 (10 Jul 2023 17:41) (120/82 - 133/76)  BP(mean): --  RR: 19 (10 Jul 2023 17:41) (17 - 19)  SpO2: 98% (10 Jul 2023 17:41) (95% - 98%)    Parameters below as of 10 Jul 2023 17:41  Patient On (Oxygen Delivery Method): room air    Abdomen is soft, distended, nontender. Patient states that her pain is improving.    Will continue to monitor.

## 2023-07-12 LAB
-  AMPICILLIN/SULBACTAM: SIGNIFICANT CHANGE UP
-  AMPICILLIN: SIGNIFICANT CHANGE UP
-  CEFAZOLIN: SIGNIFICANT CHANGE UP
-  CEFEPIME: SIGNIFICANT CHANGE UP
-  CEFTRIAXONE: SIGNIFICANT CHANGE UP
-  CIPROFLOXACIN: SIGNIFICANT CHANGE UP
-  ERTAPENEM: SIGNIFICANT CHANGE UP
-  GENTAMICIN: SIGNIFICANT CHANGE UP
-  PIPERACILLIN/TAZOBACTAM: SIGNIFICANT CHANGE UP
-  TOBRAMYCIN: SIGNIFICANT CHANGE UP
-  TRIMETHOPRIM/SULFAMETHOXAZOLE: SIGNIFICANT CHANGE UP
CULTURE RESULTS: SIGNIFICANT CHANGE UP
METHOD TYPE: SIGNIFICANT CHANGE UP
ORGANISM # SPEC MICROSCOPIC CNT: SIGNIFICANT CHANGE UP
SPECIMEN SOURCE: SIGNIFICANT CHANGE UP

## 2023-07-14 DIAGNOSIS — N83.201 UNSPECIFIED OVARIAN CYST, RIGHT SIDE: ICD-10-CM

## 2023-07-14 DIAGNOSIS — A41.9 SEPSIS, UNSPECIFIED ORGANISM: ICD-10-CM

## 2023-07-14 DIAGNOSIS — K57.20 DIVERTICULITIS OF LARGE INTESTINE WITH PERFORATION AND ABSCESS WITHOUT BLEEDING: ICD-10-CM

## 2023-07-14 DIAGNOSIS — D25.9 LEIOMYOMA OF UTERUS, UNSPECIFIED: ICD-10-CM

## 2023-07-14 DIAGNOSIS — E87.20 ACIDOSIS, UNSPECIFIED: ICD-10-CM

## 2023-07-14 DIAGNOSIS — D75.839 THROMBOCYTOSIS, UNSPECIFIED: ICD-10-CM

## 2023-07-14 DIAGNOSIS — K65.1 PERITONEAL ABSCESS: ICD-10-CM

## 2023-07-14 DIAGNOSIS — D62 ACUTE POSTHEMORRHAGIC ANEMIA: ICD-10-CM

## 2023-07-14 DIAGNOSIS — Z88.6 ALLERGY STATUS TO ANALGESIC AGENT: ICD-10-CM

## 2023-07-14 LAB
CULTURE RESULTS: SIGNIFICANT CHANGE UP
CULTURE RESULTS: SIGNIFICANT CHANGE UP
SPECIMEN SOURCE: SIGNIFICANT CHANGE UP
SPECIMEN SOURCE: SIGNIFICANT CHANGE UP

## 2023-07-17 ENCOUNTER — APPOINTMENT (OUTPATIENT)
Dept: COLORECTAL SURGERY | Facility: CLINIC | Age: 48
End: 2023-07-17
Payer: COMMERCIAL

## 2023-07-17 ENCOUNTER — NON-APPOINTMENT (OUTPATIENT)
Age: 48
End: 2023-07-17

## 2023-07-17 VITALS
OXYGEN SATURATION: 98 % | WEIGHT: 200 LBS | BODY MASS INDEX: 30.31 KG/M2 | HEIGHT: 68 IN | HEART RATE: 88 BPM | DIASTOLIC BLOOD PRESSURE: 98 MMHG | SYSTOLIC BLOOD PRESSURE: 161 MMHG | RESPIRATION RATE: 17 BRPM

## 2023-07-17 DIAGNOSIS — K57.32 DIVERTICULITIS OF LARGE INTESTINE W/OUT PERFORATION OR ABSCESS W/OUT BLEEDING: ICD-10-CM

## 2023-07-17 PROCEDURE — 99204 OFFICE O/P NEW MOD 45 MIN: CPT

## 2023-07-17 NOTE — HISTORY OF PRESENT ILLNESS
[FreeTextEntry1] : 47F with a hx of a fibroid uterus and recent hx of a perforated complicated diverticulitis with pericolonic abscess formation requiring hospitalization and percutaneous drainage at [facility] from 7/9-7/11. Pt reports that overall her pain is almost gone. Denies fevers, chills, nausea or vomiting. Tolerating a diet, and her PO abx regimen without bloating, discomfort or diarrhea. Pt has been collecting her drain output at home and reports that it is roughly 10cc daily and serous output. Of note this is her first documented episode of diverticulitis however she feels like she had been having a similar, yet less severe, left abdominal pain after eating or associated with certain foods for years.\par \par PMH: Fibroid uterus\par Meds: Denies\par All ASA\par PSH: UFA, D&C\par FH: Denies CRC/IBD\par Cscope: Never

## 2023-07-17 NOTE — PHYSICAL EXAM
[Exam Deferred] : exam was deferred [Normal Breath Sounds] : Normal breath sounds [Normal Heart Sounds] : normal heart sounds [No Rash or Lesion] : No rash or lesion [Alert] : alert [Calm] : calm [JVD] : no jugular venous distention  [de-identified] : Abd is soft, nontender, nondistended. Palpable fibroid uterus in the suprapubic space. LUQ IR drain with adequate suction and draining scant clear serous material. [de-identified] : Age appropriate female in NAD [de-identified] : MMM [de-identified] : ROM WNL

## 2023-07-17 NOTE — ASSESSMENT
[FreeTextEntry1] : 47F with a complicated left sided diverticulitis with abscess formation s/p percutaneous drainage, doing well.

## 2023-07-20 ENCOUNTER — APPOINTMENT (OUTPATIENT)
Dept: INTERVENTIONAL RADIOLOGY/VASCULAR | Facility: HOSPITAL | Age: 48
End: 2023-07-20

## 2023-07-20 ENCOUNTER — RESULT REVIEW (OUTPATIENT)
Age: 48
End: 2023-07-20

## 2023-07-20 ENCOUNTER — OUTPATIENT (OUTPATIENT)
Dept: OUTPATIENT SERVICES | Facility: HOSPITAL | Age: 48
LOS: 1 days | End: 2023-07-20
Payer: COMMERCIAL

## 2023-07-20 PROCEDURE — XXXXX: CPT | Mod: 1L

## 2023-07-20 PROCEDURE — 76080 X-RAY EXAM OF FISTULA: CPT

## 2023-07-20 PROCEDURE — 76080 X-RAY EXAM OF FISTULA: CPT | Mod: 26

## 2023-07-20 PROCEDURE — 49424 ASSESS CYST CONTRAST INJECT: CPT

## 2023-07-24 ENCOUNTER — NON-APPOINTMENT (OUTPATIENT)
Age: 48
End: 2023-07-24

## 2024-01-05 NOTE — PATIENT PROFILE ADULT. - NS TRANSFER PATIENT BELONGINGS
Physical Therapy Treatment    Patient Name: Verona Villarreal  MRN: [id number]  PT Received On: 01/05/24  Time Calculation  Start Time: 1345  Stop Time: 1435  Time Calculation (min): 50 min  PT Modalities Time Entry  E-Stim (Unattended) Time Entry: 15  PT Therapeutic Procedures Time Entry  Manual Therapy Time Entry: 25  Therapeutic Exercise Time Entry: 5    Visit Number: 3  Visits/Dates Authorized: 16    Current Problem:   1. Radiculopathy, cervical region  Follow Up In Physical Therapy        Precautions:    N/A    Subjective   General:    Pt states she feels symptoms are gradually getting better. Home exercises going well but still has R hand N&T when turning head to R or lying on L side. Tries her best to avoid aggravating positions.    Pre-Treatment Symptoms:   Pain Assessment: 0-10  Pain Score: 3    Objective   Findings:   Closing restrictions R mid-cerv region with radial nerve paresthesias with end-range rotation or sidebending, hypertonic cervical paraspinals.  Treatments:   Therapeutic Exercise:   HEP review     DNP  Cervical snags L/R rot x 10 L/R  Cervical CTJ ext x 10  Open book L/R x 10 each  Seated cervical distraction w/ scapular retraction 2x10    Neuromuscular Re-Ed: DNP  Median nerve glides L/R 2x15 each  Radial nerve glides 2x15 each L/R    Therapeutic Activity:  Educated pt on activity modification/posture modification to minimize symptom flare up while working.     Manual Therapy:   Manual traction with various   SOR  MFR/STM  L lower cerv sideglide, and R upper cerv sideglides for R mid-cerv opening    Modalities:   Dry needling following informed consent: with e-stim; 3Hz, mA to tolerance, applied to R cervical paraspinals, SCM, R UT, for 15 minutes.  In massage chair    Assessment:    Good tolerance to cervical traction and sideglide mobs for opening R mid-cerv (C4-5) joints, but symptoms highly irritable with end-range R sidebending or rotation, still limited compared with L. Today's treatment  focused on MT and DN+stim for cervical decompression and reduction of hypertonic musculature.    Post-Treatment Symptoms:   Response to Interventions: No change    Plan:    Reduce R UE symptoms.     Goals:   Active       PT Problem       neck       Start:  12/15/23    Expected End:  03/15/24       1) Pt will report pain levels no greater than 2/10 at worst with activity for less difficulty turning head, looking up, and driving.  2) Pt will display pain-free cervical spine AROM WFL for less difficulty turning head, looking up, and driving.  3) Pt will score <10% impairment on NDI to indicate significant improvement in neck function.  4) Pt will deny any radiating pain or N&T into BUE.  5) Pt will improve postural awareness and understand proper ergonomics enabling patient to make timely self-corrections and avoid postural strain that develops into myofascial restrictions.             Cell Phone/PDA (specify)/Jewelry/Money (specify)/Clothing/bracelet and chain

## 2024-12-25 PROBLEM — F10.90 ALCOHOL USE: Status: ACTIVE | Noted: 2020-01-07

## 2025-02-17 NOTE — ED ADULT TRIAGE NOTE - TEMPERATURE IN FAHRENHEIT (DEGREES F)
Patient scheduled 2/24/25 for an echocardiogram. Please reach out to patient for current insurance information so pre cert can move forward. Thank you.    97.4

## 2025-06-11 ENCOUNTER — NON-APPOINTMENT (OUTPATIENT)
Age: 50
End: 2025-06-11

## 2025-06-13 ENCOUNTER — APPOINTMENT (OUTPATIENT)
Dept: INTERNAL MEDICINE | Facility: CLINIC | Age: 50
End: 2025-06-13
Payer: COMMERCIAL

## 2025-06-13 ENCOUNTER — LABORATORY RESULT (OUTPATIENT)
Age: 50
End: 2025-06-13

## 2025-06-13 VITALS — SYSTOLIC BLOOD PRESSURE: 136 MMHG | DIASTOLIC BLOOD PRESSURE: 86 MMHG

## 2025-06-13 VITALS
TEMPERATURE: 97.2 F | OXYGEN SATURATION: 100 % | DIASTOLIC BLOOD PRESSURE: 86 MMHG | WEIGHT: 250 LBS | SYSTOLIC BLOOD PRESSURE: 141 MMHG | BODY MASS INDEX: 37.89 KG/M2 | HEART RATE: 88 BPM | HEIGHT: 68 IN

## 2025-06-13 PROCEDURE — 99396 PREV VISIT EST AGE 40-64: CPT | Mod: 25

## 2025-06-13 PROCEDURE — G0444 DEPRESSION SCREEN ANNUAL: CPT | Mod: 59

## 2025-06-13 PROCEDURE — 36415 COLL VENOUS BLD VENIPUNCTURE: CPT

## 2025-06-13 PROCEDURE — 99213 OFFICE O/P EST LOW 20 MIN: CPT | Mod: 25,GC

## 2025-06-17 LAB
ALBUMIN SERPL ELPH-MCNC: 4.1 G/DL
ALP BLD-CCNC: 83 U/L
ALT SERPL-CCNC: 18 U/L
ANION GAP SERPL CALC-SCNC: 15 MMOL/L
AST SERPL-CCNC: 15 U/L
BASOPHILS # BLD AUTO: 0.04 K/UL
BASOPHILS NFR BLD AUTO: 0.8 %
BILIRUB SERPL-MCNC: 0.4 MG/DL
BUN SERPL-MCNC: 6 MG/DL
C TRACH RRNA SPEC QL NAA+PROBE: NOT DETECTED
CALCIUM SERPL-MCNC: 9.9 MG/DL
CHLORIDE SERPL-SCNC: 107 MMOL/L
CHOLEST SERPL-MCNC: 159 MG/DL
CO2 SERPL-SCNC: 20 MMOL/L
CREAT SERPL-MCNC: 0.79 MG/DL
EGFRCR SERPLBLD CKD-EPI 2021: 92 ML/MIN/1.73M2
EOSINOPHIL # BLD AUTO: 0.07 K/UL
EOSINOPHIL NFR BLD AUTO: 1.4 %
ESTIMATED AVERAGE GLUCOSE: 111 MG/DL
GLUCOSE SERPL-MCNC: 86 MG/DL
HBA1C MFR BLD HPLC: 5.5 %
HBV SURFACE AB SER QL: REACTIVE
HCT VFR BLD CALC: 44.4 %
HCV AB SER QL: NONREACTIVE
HCV RNA SERPL NAA DL=5-ACNC: NOT DETECTED IU/ML
HCV RNA SERPL NAA+PROBE-LOG IU: NOT DETECTED LOGIU/ML
HCV S/CO RATIO: 0.12 S/CO
HDLC SERPL-MCNC: 53 MG/DL
HEPC RNA INTERP: NOT DETECTED
HGB BLD-MCNC: 13.1 G/DL
IMM GRANULOCYTES NFR BLD AUTO: 0.2 %
LDLC SERPL-MCNC: 88 MG/DL
LYMPHOCYTES # BLD AUTO: 1.87 K/UL
LYMPHOCYTES NFR BLD AUTO: 38 %
MAN DIFF?: NORMAL
MCHC RBC-ENTMCNC: 28.7 PG
MCHC RBC-ENTMCNC: 29.5 G/DL
MCV RBC AUTO: 97.2 FL
MONOCYTES # BLD AUTO: 0.36 K/UL
MONOCYTES NFR BLD AUTO: 7.3 %
N GONORRHOEA RRNA SPEC QL NAA+PROBE: NOT DETECTED
NEUTROPHILS # BLD AUTO: 2.57 K/UL
NEUTROPHILS NFR BLD AUTO: 52.3 %
NONHDLC SERPL-MCNC: 106 MG/DL
PLATELET # BLD AUTO: 189 K/UL
POTASSIUM SERPL-SCNC: 4.3 MMOL/L
PROT SERPL-MCNC: 7.4 G/DL
RBC # BLD: 4.57 M/UL
RBC # FLD: 14 %
SODIUM SERPL-SCNC: 142 MMOL/L
SOURCE AMPLIFICATION: NORMAL
T PALLIDUM AB SER QL IA: NEGATIVE
TRIGL SERPL-MCNC: 99 MG/DL
TSH SERPL-ACNC: 0.82 UIU/ML
WBC # FLD AUTO: 4.92 K/UL